# Patient Record
Sex: MALE | Race: WHITE | NOT HISPANIC OR LATINO | Employment: OTHER | URBAN - METROPOLITAN AREA
[De-identification: names, ages, dates, MRNs, and addresses within clinical notes are randomized per-mention and may not be internally consistent; named-entity substitution may affect disease eponyms.]

---

## 2022-01-01 ENCOUNTER — APPOINTMENT (OUTPATIENT)
Dept: RADIOLOGY | Facility: MEDICAL CENTER | Age: 77
DRG: 208 | End: 2022-01-01
Attending: INTERNAL MEDICINE
Payer: MEDICARE

## 2022-01-01 ENCOUNTER — APPOINTMENT (OUTPATIENT)
Dept: RADIOLOGY | Facility: MEDICAL CENTER | Age: 77
DRG: 208 | End: 2022-01-01
Attending: EMERGENCY MEDICINE
Payer: MEDICARE

## 2022-01-01 ENCOUNTER — APPOINTMENT (OUTPATIENT)
Dept: RADIOLOGY | Facility: MEDICAL CENTER | Age: 77
DRG: 208 | End: 2022-01-01
Attending: STUDENT IN AN ORGANIZED HEALTH CARE EDUCATION/TRAINING PROGRAM
Payer: MEDICARE

## 2022-01-01 ENCOUNTER — HOSPITAL ENCOUNTER (INPATIENT)
Facility: MEDICAL CENTER | Age: 77
LOS: 3 days | DRG: 208 | End: 2022-07-24
Attending: EMERGENCY MEDICINE | Admitting: STUDENT IN AN ORGANIZED HEALTH CARE EDUCATION/TRAINING PROGRAM
Payer: MEDICARE

## 2022-01-01 ENCOUNTER — APPOINTMENT (OUTPATIENT)
Dept: CARDIOLOGY | Facility: MEDICAL CENTER | Age: 77
DRG: 208 | End: 2022-01-01
Attending: STUDENT IN AN ORGANIZED HEALTH CARE EDUCATION/TRAINING PROGRAM
Payer: MEDICARE

## 2022-01-01 DIAGNOSIS — J44.9 CHRONIC OBSTRUCTIVE PULMONARY DISEASE, UNSPECIFIED COPD TYPE (HCC): ICD-10-CM

## 2022-01-01 DIAGNOSIS — J90 PLEURAL EFFUSION: ICD-10-CM

## 2022-01-01 DIAGNOSIS — I95.9 HYPOTENSION, UNSPECIFIED HYPOTENSION TYPE: ICD-10-CM

## 2022-01-01 DIAGNOSIS — J98.19 LUNG COLLAPSE: ICD-10-CM

## 2022-01-01 DIAGNOSIS — I46.9 CARDIAC ARREST (HCC): Primary | ICD-10-CM

## 2022-01-01 DIAGNOSIS — R09.02 HYPOXIA: ICD-10-CM

## 2022-01-01 LAB
ALBUMIN SERPL BCP-MCNC: 3.2 G/DL (ref 3.2–4.9)
ALBUMIN/GLOB SERPL: 1.3 G/DL
ALP SERPL-CCNC: 99 U/L (ref 30–99)
ALT SERPL-CCNC: 31 U/L (ref 2–50)
ANION GAP SERPL CALC-SCNC: 10 MMOL/L (ref 7–16)
ANION GAP SERPL CALC-SCNC: 10 MMOL/L (ref 7–16)
ANION GAP SERPL CALC-SCNC: 7 MMOL/L (ref 7–16)
ANION GAP SERPL CALC-SCNC: 9 MMOL/L (ref 7–16)
ANION GAP SERPL CALC-SCNC: 9 MMOL/L (ref 7–16)
APPEARANCE FLD: NORMAL
APPEARANCE UR: CLEAR
AST SERPL-CCNC: 36 U/L (ref 12–45)
BACTERIA #/AREA URNS HPF: NEGATIVE /HPF
BACTERIA BRONCH AEROBE CULT: NORMAL
BACTERIA BRONCH AEROBE CULT: NORMAL
BASE EXCESS BLDA CALC-SCNC: 15 MMOL/L (ref -4–3)
BASE EXCESS BLDA CALC-SCNC: 16 MMOL/L (ref -4–3)
BASE EXCESS BLDA CALC-SCNC: 4 MMOL/L (ref -4–3)
BASE EXCESS BLDA CALC-SCNC: 5 MMOL/L (ref -4–3)
BASE EXCESS BLDA CALC-SCNC: 5 MMOL/L (ref -4–3)
BASE EXCESS BLDV CALC-SCNC: 3 MMOL/L (ref -4–3)
BASOPHILS # BLD AUTO: 0 % (ref 0–1.8)
BASOPHILS # BLD AUTO: 0.1 % (ref 0–1.8)
BASOPHILS # BLD: 0 K/UL (ref 0–0.12)
BASOPHILS # BLD: 0.02 K/UL (ref 0–0.12)
BILIRUB SERPL-MCNC: 0.5 MG/DL (ref 0.1–1.5)
BILIRUB UR QL STRIP.AUTO: NEGATIVE
BODY FLD TYPE: NORMAL
BODY TEMPERATURE: ABNORMAL DEGREES
BREATHS SETTING VENT: 20
BREATHS SETTING VENT: 24
BUN SERPL-MCNC: 15 MG/DL (ref 8–22)
BUN SERPL-MCNC: 15 MG/DL (ref 8–22)
BUN SERPL-MCNC: 17 MG/DL (ref 8–22)
BUN SERPL-MCNC: 17 MG/DL (ref 8–22)
BUN SERPL-MCNC: 20 MG/DL (ref 8–22)
CALCIUM SERPL-MCNC: 8.2 MG/DL (ref 8.5–10.5)
CALCIUM SERPL-MCNC: 8.4 MG/DL (ref 8.5–10.5)
CALCIUM SERPL-MCNC: 8.6 MG/DL (ref 8.5–10.5)
CALCIUM SERPL-MCNC: 8.6 MG/DL (ref 8.5–10.5)
CALCIUM SERPL-MCNC: 8.7 MG/DL (ref 8.5–10.5)
CFT BLD TEG: 5 MIN (ref 4.6–9.1)
CFT P HPASE BLD TEG: 4.5 MIN (ref 4.3–8.3)
CHLORIDE SERPL-SCNC: 85 MMOL/L (ref 96–112)
CHLORIDE SERPL-SCNC: 89 MMOL/L (ref 96–112)
CHLORIDE SERPL-SCNC: 90 MMOL/L (ref 96–112)
CHLORIDE SERPL-SCNC: 92 MMOL/L (ref 96–112)
CHLORIDE SERPL-SCNC: 93 MMOL/L (ref 96–112)
CK SERPL-CCNC: 124 U/L (ref 0–154)
CK SERPL-CCNC: 88 U/L (ref 0–154)
CLOT ANGLE BLD TEG: 74.9 DEGREES (ref 63–78)
CLOT LYSIS 30M P MA LENFR BLD TEG: 0 % (ref 0–2.6)
CO2 BLDA-SCNC: 35 MMOL/L (ref 20–33)
CO2 BLDA-SCNC: 37 MMOL/L (ref 20–33)
CO2 BLDA-SCNC: 38 MMOL/L (ref 20–33)
CO2 BLDA-SCNC: 41 MMOL/L (ref 20–33)
CO2 BLDA-SCNC: 42 MMOL/L (ref 20–33)
CO2 BLDV-SCNC: 39 MMOL/L (ref 20–33)
CO2 SERPL-SCNC: 29 MMOL/L (ref 20–33)
CO2 SERPL-SCNC: 29 MMOL/L (ref 20–33)
CO2 SERPL-SCNC: 31 MMOL/L (ref 20–33)
CO2 SERPL-SCNC: 33 MMOL/L (ref 20–33)
CO2 SERPL-SCNC: 36 MMOL/L (ref 20–33)
COLOR FLD: NORMAL
COLOR UR: YELLOW
CREAT SERPL-MCNC: 0.37 MG/DL (ref 0.5–1.4)
CREAT SERPL-MCNC: 0.44 MG/DL (ref 0.5–1.4)
CREAT SERPL-MCNC: 0.47 MG/DL (ref 0.5–1.4)
CREAT SERPL-MCNC: 0.57 MG/DL (ref 0.5–1.4)
CREAT SERPL-MCNC: 0.7 MG/DL (ref 0.5–1.4)
CRP SERPL HS-MCNC: 8.62 MG/DL (ref 0–0.75)
CT.EXTRINSIC BLD ROTEM: 1.3 MIN (ref 0.8–2.1)
CYTOLOGY REG CYTOL: NORMAL
CYTOLOGY REG CYTOL: NORMAL
D DIMER PPP IA.FEU-MCNC: 3.83 UG/ML (FEU) (ref 0–0.5)
DELSYS IDSYS: ABNORMAL
EKG IMPRESSION: NORMAL
END TIDAL CARBON DIOXIDE IECO2: 37 MMHG
END TIDAL CARBON DIOXIDE IECO2: 39 MMHG
END TIDAL CARBON DIOXIDE IECO2: 40 MMHG
END TIDAL CARBON DIOXIDE IECO2: 44 MMHG
END TIDAL CARBON DIOXIDE IECO2: 47 MMHG
EOSINOPHIL # BLD AUTO: 0 K/UL (ref 0–0.51)
EOSINOPHIL # BLD AUTO: 0.03 K/UL (ref 0–0.51)
EOSINOPHIL NFR BLD: 0 % (ref 0–6.9)
EOSINOPHIL NFR BLD: 0.2 % (ref 0–6.9)
EPI CELLS #/AREA URNS HPF: ABNORMAL /HPF
ERYTHROCYTE [DISTWIDTH] IN BLOOD BY AUTOMATED COUNT: 50 FL (ref 35.9–50)
ERYTHROCYTE [DISTWIDTH] IN BLOOD BY AUTOMATED COUNT: 50.3 FL (ref 35.9–50)
ERYTHROCYTE [DISTWIDTH] IN BLOOD BY AUTOMATED COUNT: 51 FL (ref 35.9–50)
ERYTHROCYTE [DISTWIDTH] IN BLOOD BY AUTOMATED COUNT: 51.7 FL (ref 35.9–50)
FLUAV RNA SPEC QL NAA+PROBE: NEGATIVE
FLUBV RNA SPEC QL NAA+PROBE: NEGATIVE
FUNGUS SPEC FUNGUS STN: NORMAL
FUNGUS SPEC FUNGUS STN: NORMAL
GFR SERPLBLD CREATININE-BSD FMLA CKD-EPI: 101 ML/MIN/1.73 M 2
GFR SERPLBLD CREATININE-BSD FMLA CKD-EPI: 107 ML/MIN/1.73 M 2
GFR SERPLBLD CREATININE-BSD FMLA CKD-EPI: 109 ML/MIN/1.73 M 2
GFR SERPLBLD CREATININE-BSD FMLA CKD-EPI: 115 ML/MIN/1.73 M 2
GFR SERPLBLD CREATININE-BSD FMLA CKD-EPI: 95 ML/MIN/1.73 M 2
GLOBULIN SER CALC-MCNC: 2.4 G/DL (ref 1.9–3.5)
GLUCOSE BLD STRIP.AUTO-MCNC: 193 MG/DL (ref 65–99)
GLUCOSE FLD-MCNC: 146 MG/DL
GLUCOSE SERPL-MCNC: 112 MG/DL (ref 65–99)
GLUCOSE SERPL-MCNC: 126 MG/DL (ref 65–99)
GLUCOSE SERPL-MCNC: 146 MG/DL (ref 65–99)
GLUCOSE SERPL-MCNC: 168 MG/DL (ref 65–99)
GLUCOSE SERPL-MCNC: 224 MG/DL (ref 65–99)
GLUCOSE UR STRIP.AUTO-MCNC: NEGATIVE MG/DL
GRAM STN SPEC: ABNORMAL
GRAM STN SPEC: NORMAL
HCO3 BLDA-SCNC: 32.9 MMOL/L (ref 17–25)
HCO3 BLDA-SCNC: 34.9 MMOL/L (ref 17–25)
HCO3 BLDA-SCNC: 35.1 MMOL/L (ref 17–25)
HCO3 BLDA-SCNC: 39.3 MMOL/L (ref 17–25)
HCO3 BLDA-SCNC: 40.7 MMOL/L (ref 17–25)
HCO3 BLDV-SCNC: 35.9 MMOL/L (ref 24–28)
HCT VFR BLD AUTO: 38.5 % (ref 42–52)
HCT VFR BLD AUTO: 41.3 % (ref 42–52)
HCT VFR BLD AUTO: 44.3 % (ref 42–52)
HCT VFR BLD AUTO: 44.8 % (ref 42–52)
HGB BLD-MCNC: 12.6 G/DL (ref 14–18)
HGB BLD-MCNC: 13.6 G/DL (ref 14–18)
HGB BLD-MCNC: 14.5 G/DL (ref 14–18)
HGB BLD-MCNC: 14.8 G/DL (ref 14–18)
HOROWITZ INDEX BLDA+IHG-RTO: 102 MM[HG]
HOROWITZ INDEX BLDA+IHG-RTO: 50 MM[HG]
HOROWITZ INDEX BLDA+IHG-RTO: 56 MM[HG]
HOROWITZ INDEX BLDA+IHG-RTO: 81 MM[HG]
HOROWITZ INDEX BLDA+IHG-RTO: 97 MM[HG]
HOROWITZ INDEX BLDV+IHG-RTO: 32 MM[HG]
IMM GRANULOCYTES # BLD AUTO: 0.07 K/UL (ref 0–0.11)
IMM GRANULOCYTES # BLD AUTO: 0.07 K/UL (ref 0–0.11)
IMM GRANULOCYTES NFR BLD AUTO: 0.5 % (ref 0–0.9)
IMM GRANULOCYTES NFR BLD AUTO: 0.6 % (ref 0–0.9)
INR PPP: 1.08 (ref 0.87–1.13)
KETONES UR STRIP.AUTO-MCNC: NEGATIVE MG/DL
LACTATE SERPL-SCNC: 2.4 MMOL/L (ref 0.5–2)
LACTATE SERPL-SCNC: 2.5 MMOL/L (ref 0.5–2)
LACTATE SERPL-SCNC: 2.6 MMOL/L (ref 0.5–2)
LDH FLD L TO P-CCNC: 286 U/L
LEUKOCYTE ESTERASE UR QL STRIP.AUTO: NEGATIVE
LV EJECT FRACT  99904: 70
LYMPHOCYTES # BLD AUTO: 0.19 K/UL (ref 1–4.8)
LYMPHOCYTES # BLD AUTO: 0.54 K/UL (ref 1–4.8)
LYMPHOCYTES # BLD AUTO: 0.68 K/UL (ref 1–4.8)
LYMPHOCYTES # BLD AUTO: 1.28 K/UL (ref 1–4.8)
LYMPHOCYTES NFR BLD: 0.9 % (ref 22–41)
LYMPHOCYTES NFR BLD: 4.6 % (ref 22–41)
LYMPHOCYTES NFR BLD: 5 % (ref 22–41)
LYMPHOCYTES NFR BLD: 6.9 % (ref 22–41)
LYMPHOCYTES NFR FLD: 82 %
MAGNESIUM SERPL-MCNC: 2 MG/DL (ref 1.5–2.5)
MAGNESIUM SERPL-MCNC: 2.1 MG/DL (ref 1.5–2.5)
MAGNESIUM SERPL-MCNC: 2.5 MG/DL (ref 1.5–2.5)
MAGNESIUM SERPL-MCNC: 3.3 MG/DL (ref 1.5–2.5)
MANUAL DIFF BLD: NORMAL
MANUAL DIFF BLD: NORMAL
MCF BLD TEG: 63.2 MM (ref 52–69)
MCF.PLATELET INHIB BLD ROTEM: 20.3 MM (ref 15–32)
MCH RBC QN AUTO: 31.1 PG (ref 27–33)
MCH RBC QN AUTO: 31.2 PG (ref 27–33)
MCH RBC QN AUTO: 31.3 PG (ref 27–33)
MCH RBC QN AUTO: 31.7 PG (ref 27–33)
MCHC RBC AUTO-ENTMCNC: 32.7 G/DL (ref 33.7–35.3)
MCHC RBC AUTO-ENTMCNC: 32.7 G/DL (ref 33.7–35.3)
MCHC RBC AUTO-ENTMCNC: 32.9 G/DL (ref 33.7–35.3)
MCHC RBC AUTO-ENTMCNC: 33 G/DL (ref 33.7–35.3)
MCV RBC AUTO: 94.3 FL (ref 81.4–97.8)
MCV RBC AUTO: 95.1 FL (ref 81.4–97.8)
MCV RBC AUTO: 95.2 FL (ref 81.4–97.8)
MCV RBC AUTO: 96.7 FL (ref 81.4–97.8)
MESOTHL CELL NFR FLD: 10 %
MICRO URNS: ABNORMAL
MODE IMODE: ABNORMAL
MONOCYTES # BLD AUTO: 0.65 K/UL (ref 0–0.85)
MONOCYTES # BLD AUTO: 1.21 K/UL (ref 0–0.85)
MONOCYTES # BLD AUTO: 1.39 K/UL (ref 0–0.85)
MONOCYTES # BLD AUTO: 1.84 K/UL (ref 0–0.85)
MONOCYTES NFR BLD AUTO: 10.2 % (ref 0–13.4)
MONOCYTES NFR BLD AUTO: 10.3 % (ref 0–13.4)
MONOCYTES NFR BLD AUTO: 3.5 % (ref 0–13.4)
MONOCYTES NFR BLD AUTO: 8.7 % (ref 0–13.4)
MORPHOLOGY BLD-IMP: NORMAL
MORPHOLOGY BLD-IMP: NORMAL
MYELOCYTES NFR BLD MANUAL: 2.6 %
NEUTROPHILS # BLD AUTO: 11.43 K/UL (ref 1.82–7.42)
NEUTROPHILS # BLD AUTO: 16.1 K/UL (ref 1.82–7.42)
NEUTROPHILS # BLD AUTO: 19.16 K/UL (ref 1.82–7.42)
NEUTROPHILS # BLD AUTO: 9.97 K/UL (ref 1.82–7.42)
NEUTROPHILS NFR BLD: 84 % (ref 44–72)
NEUTROPHILS NFR BLD: 84.5 % (ref 44–72)
NEUTROPHILS NFR BLD: 87 % (ref 44–72)
NEUTROPHILS NFR BLD: 90.4 % (ref 44–72)
NEUTROPHILS NFR FLD: 8 %
NITRITE UR QL STRIP.AUTO: NEGATIVE
NRBC # BLD AUTO: 0 K/UL
NRBC BLD-RTO: 0 /100 WBC
NT-PROBNP SERPL IA-MCNC: 918 PG/ML (ref 0–125)
O2/TOTAL GAS SETTING VFR VENT: 100 %
O2/TOTAL GAS SETTING VFR VENT: 60 %
O2/TOTAL GAS SETTING VFR VENT: 60 %
O2/TOTAL GAS SETTING VFR VENT: 90 %
OSMOLALITY UR: 493 MOSM/KG H2O (ref 300–900)
PA AA BLD-ACNC: 10 % (ref 0–11)
PA ADP BLD-ACNC: 9.3 % (ref 0–17)
PCO2 BLDA: 46.3 MMHG (ref 26–37)
PCO2 BLDA: 50 MMHG (ref 26–37)
PCO2 BLDA: 61.8 MMHG (ref 26–37)
PCO2 BLDA: 81.1 MMHG (ref 26–37)
PCO2 BLDA: 86.6 MMHG (ref 26–37)
PCO2 BLDV: 94.4 MMHG (ref 41–51)
PCO2 TEMP ADJ BLDA: 47.7 MMHG (ref 26–37)
PCO2 TEMP ADJ BLDA: 50.9 MMHG (ref 26–37)
PCO2 TEMP ADJ BLDA: 64 MMHG (ref 26–37)
PCO2 TEMP ADJ BLDA: 77 MMHG (ref 26–37)
PCO2 TEMP ADJ BLDA: 82.9 MMHG (ref 26–37)
PCO2 TEMP ADJ BLDV: 90.3 MMHG (ref 41–51)
PEEP END EXPIRATORY PRESSURE IPEEP: 10 CMH20
PEEP END EXPIRATORY PRESSURE IPEEP: 10 CMH20
PEEP END EXPIRATORY PRESSURE IPEEP: 12 CMH20
PEEP END EXPIRATORY PRESSURE IPEEP: 14 CMH20
PERCENT MINUTE VOLUME IPMV: 100
PERCENT MINUTE VOLUME IPMV: 80
PH BLDA: 7.21 [PH] (ref 7.4–7.5)
PH BLDA: 7.24 [PH] (ref 7.4–7.5)
PH BLDA: 7.33 [PH] (ref 7.4–7.5)
PH BLDA: 7.52 [PH] (ref 7.4–7.5)
PH BLDA: 7.54 [PH] (ref 7.4–7.5)
PH BLDV: 7.19 [PH] (ref 7.31–7.45)
PH FLD: 8 [PH]
PH TEMP ADJ BLDA: 7.23 [PH] (ref 7.4–7.5)
PH TEMP ADJ BLDA: 7.26 [PH] (ref 7.4–7.5)
PH TEMP ADJ BLDA: 7.32 [PH] (ref 7.4–7.5)
PH TEMP ADJ BLDA: 7.51 [PH] (ref 7.4–7.5)
PH TEMP ADJ BLDA: 7.53 [PH] (ref 7.4–7.5)
PH TEMP ADJ BLDV: 7.2 [PH] (ref 7.31–7.45)
PH UR STRIP.AUTO: 5 [PH] (ref 5–8)
PHOSPHATE SERPL-MCNC: 2.2 MG/DL (ref 2.5–4.5)
PHOSPHATE SERPL-MCNC: 2.9 MG/DL (ref 2.5–4.5)
PHOSPHATE SERPL-MCNC: 4 MG/DL (ref 2.5–4.5)
PHOSPHATE SERPL-MCNC: 4.9 MG/DL (ref 2.5–4.5)
PLATELET # BLD AUTO: 175 K/UL (ref 164–446)
PLATELET # BLD AUTO: 177 K/UL (ref 164–446)
PLATELET # BLD AUTO: 248 K/UL (ref 164–446)
PLATELET # BLD AUTO: 268 K/UL (ref 164–446)
PLATELET BLD QL SMEAR: NORMAL
PLATELET BLD QL SMEAR: NORMAL
PMV BLD AUTO: 10.1 FL (ref 9–12.9)
PMV BLD AUTO: 10.5 FL (ref 9–12.9)
PMV BLD AUTO: 10.5 FL (ref 9–12.9)
PMV BLD AUTO: 10.6 FL (ref 9–12.9)
PO2 BLDA: 50 MMHG (ref 64–87)
PO2 BLDA: 56 MMHG (ref 64–87)
PO2 BLDA: 58 MMHG (ref 64–87)
PO2 BLDA: 61 MMHG (ref 64–87)
PO2 BLDA: 73 MMHG (ref 64–87)
PO2 BLDV: 32 MMHG (ref 25–40)
PO2 TEMP ADJ BLDA: 46 MMHG (ref 64–87)
PO2 TEMP ADJ BLDA: 52 MMHG (ref 64–87)
PO2 TEMP ADJ BLDA: 59 MMHG (ref 64–87)
PO2 TEMP ADJ BLDA: 64 MMHG (ref 64–87)
PO2 TEMP ADJ BLDA: 77 MMHG (ref 64–87)
PO2 TEMP ADJ BLDV: 30 MMHG (ref 25–40)
POTASSIUM SERPL-SCNC: 3.7 MMOL/L (ref 3.6–5.5)
POTASSIUM SERPL-SCNC: 4 MMOL/L (ref 3.6–5.5)
POTASSIUM SERPL-SCNC: 4.2 MMOL/L (ref 3.6–5.5)
POTASSIUM SERPL-SCNC: 4.2 MMOL/L (ref 3.6–5.5)
POTASSIUM SERPL-SCNC: 4.8 MMOL/L (ref 3.6–5.5)
PREALB SERPL-MCNC: 12 MG/DL (ref 18–38)
PROCALCITONIN SERPL-MCNC: 0.14 NG/ML
PROLACTIN SERPL-MCNC: 55.6 NG/ML (ref 2.1–17.7)
PROT FLD-MCNC: 3.7 G/DL
PROT SERPL-MCNC: 5.6 G/DL (ref 6–8.2)
PROT UR QL STRIP: 100 MG/DL
PROTHROMBIN TIME: 13.9 SEC (ref 12–14.6)
RBC # BLD AUTO: 4.05 M/UL (ref 4.7–6.1)
RBC # BLD AUTO: 4.34 M/UL (ref 4.7–6.1)
RBC # BLD AUTO: 4.58 M/UL (ref 4.7–6.1)
RBC # BLD AUTO: 4.75 M/UL (ref 4.7–6.1)
RBC # FLD: NORMAL CELLS/UL
RBC # URNS HPF: ABNORMAL /HPF
RBC BLD AUTO: NORMAL
RBC BLD AUTO: NORMAL
RBC UR QL AUTO: ABNORMAL
RENAL EPI CELLS #/AREA URNS HPF: NEGATIVE /HPF
RSV RNA SPEC QL NAA+PROBE: NEGATIVE
SAO2 % BLDA: 76 % (ref 93–99)
SAO2 % BLDA: 80 % (ref 93–99)
SAO2 % BLDA: 92 % (ref 93–99)
SAO2 % BLDA: 93 % (ref 93–99)
SAO2 % BLDA: 93 % (ref 93–99)
SAO2 % BLDV: 45 %
SARS-COV-2 RNA RESP QL NAA+PROBE: NOTDETECTED
SIGNIFICANT IND 70042: ABNORMAL
SIGNIFICANT IND 70042: NORMAL
SITE SITE: ABNORMAL
SITE SITE: NORMAL
SODIUM SERPL-SCNC: 128 MMOL/L (ref 135–145)
SODIUM SERPL-SCNC: 128 MMOL/L (ref 135–145)
SODIUM SERPL-SCNC: 131 MMOL/L (ref 135–145)
SODIUM SERPL-SCNC: 132 MMOL/L (ref 135–145)
SODIUM SERPL-SCNC: 133 MMOL/L (ref 135–145)
SODIUM UR-SCNC: <20 MMOL/L
SOURCE SOURCE: ABNORMAL
SOURCE SOURCE: NORMAL
SP GR UR STRIP.AUTO: >=1.045
SPECIMEN DRAWN FROM PATIENT: ABNORMAL
SPECIMEN SOURCE: NORMAL
TEG ALGORITHM TGALG: NORMAL
TIDAL VOLUME IVT: 380 ML
TIDAL VOLUME IVT: 400 ML
TROPONIN T SERPL-MCNC: 76 NG/L (ref 6–19)
UROBILINOGEN UR STRIP.AUTO-MCNC: 0.2 MG/DL
WBC # BLD AUTO: 11.8 K/UL (ref 4.8–10.8)
WBC # BLD AUTO: 13.6 K/UL (ref 4.8–10.8)
WBC # BLD AUTO: 18.5 K/UL (ref 4.8–10.8)
WBC # BLD AUTO: 21.2 K/UL (ref 4.8–10.8)
WBC # FLD: 1877 CELLS/UL
WBC #/AREA URNS HPF: ABNORMAL /HPF

## 2022-01-01 PROCEDURE — 94150 VITAL CAPACITY TEST: CPT

## 2022-01-01 PROCEDURE — 96375 TX/PRO/DX INJ NEW DRUG ADDON: CPT

## 2022-01-01 PROCEDURE — 85025 COMPLETE CBC W/AUTO DIFF WBC: CPT

## 2022-01-01 PROCEDURE — 94002 VENT MGMT INPAT INIT DAY: CPT

## 2022-01-01 PROCEDURE — 82550 ASSAY OF CK (CPK): CPT

## 2022-01-01 PROCEDURE — 74175 CTA ABDOMEN W/CONTRAST: CPT

## 2022-01-01 PROCEDURE — 36600 WITHDRAWAL OF ARTERIAL BLOOD: CPT

## 2022-01-01 PROCEDURE — 700105 HCHG RX REV CODE 258: Performed by: INTERNAL MEDICINE

## 2022-01-01 PROCEDURE — 96366 THER/PROPH/DIAG IV INF ADDON: CPT

## 2022-01-01 PROCEDURE — 83605 ASSAY OF LACTIC ACID: CPT

## 2022-01-01 PROCEDURE — 80053 COMPREHEN METABOLIC PANEL: CPT

## 2022-01-01 PROCEDURE — 99292 CRITICAL CARE ADDL 30 MIN: CPT | Mod: GC | Performed by: STUDENT IN AN ORGANIZED HEALTH CARE EDUCATION/TRAINING PROGRAM

## 2022-01-01 PROCEDURE — 51702 INSERT TEMP BLADDER CATH: CPT

## 2022-01-01 PROCEDURE — 87205 SMEAR GRAM STAIN: CPT | Mod: 91

## 2022-01-01 PROCEDURE — 700111 HCHG RX REV CODE 636 W/ 250 OVERRIDE (IP): Performed by: INTERNAL MEDICINE

## 2022-01-01 PROCEDURE — 700111 HCHG RX REV CODE 636 W/ 250 OVERRIDE (IP): Performed by: STUDENT IN AN ORGANIZED HEALTH CARE EDUCATION/TRAINING PROGRAM

## 2022-01-01 PROCEDURE — 85347 COAGULATION TIME ACTIVATED: CPT

## 2022-01-01 PROCEDURE — 700102 HCHG RX REV CODE 250 W/ 637 OVERRIDE(OP): Performed by: INTERNAL MEDICINE

## 2022-01-01 PROCEDURE — 88341 IMHCHEM/IMCYTCHM EA ADD ANTB: CPT | Mod: 91

## 2022-01-01 PROCEDURE — 84484 ASSAY OF TROPONIN QUANT: CPT

## 2022-01-01 PROCEDURE — A9270 NON-COVERED ITEM OR SERVICE: HCPCS | Performed by: INTERNAL MEDICINE

## 2022-01-01 PROCEDURE — 80048 BASIC METABOLIC PNL TOTAL CA: CPT

## 2022-01-01 PROCEDURE — 32554 ASPIRATE PLEURA W/O IMAGING: CPT

## 2022-01-01 PROCEDURE — 700102 HCHG RX REV CODE 250 W/ 637 OVERRIDE(OP): Performed by: STUDENT IN AN ORGANIZED HEALTH CARE EDUCATION/TRAINING PROGRAM

## 2022-01-01 PROCEDURE — 31624 DX BRONCHOSCOPE/LAVAGE: CPT | Performed by: INTERNAL MEDICINE

## 2022-01-01 PROCEDURE — 700101 HCHG RX REV CODE 250: Performed by: STUDENT IN AN ORGANIZED HEALTH CARE EDUCATION/TRAINING PROGRAM

## 2022-01-01 PROCEDURE — 93970 EXTREMITY STUDY: CPT

## 2022-01-01 PROCEDURE — 92950 HEART/LUNG RESUSCITATION CPR: CPT

## 2022-01-01 PROCEDURE — 84100 ASSAY OF PHOSPHORUS: CPT

## 2022-01-01 PROCEDURE — 85576 BLOOD PLATELET AGGREGATION: CPT | Mod: 91

## 2022-01-01 PROCEDURE — 0W9B3ZX DRAINAGE OF LEFT PLEURAL CAVITY, PERCUTANEOUS APPROACH, DIAGNOSTIC: ICD-10-PCS | Performed by: STUDENT IN AN ORGANIZED HEALTH CARE EDUCATION/TRAINING PROGRAM

## 2022-01-01 PROCEDURE — 700117 HCHG RX CONTRAST REV CODE 255: Performed by: EMERGENCY MEDICINE

## 2022-01-01 PROCEDURE — 36415 COLL VENOUS BLD VENIPUNCTURE: CPT

## 2022-01-01 PROCEDURE — 93306 TTE W/DOPPLER COMPLETE: CPT | Mod: 26 | Performed by: INTERNAL MEDICINE

## 2022-01-01 PROCEDURE — 83735 ASSAY OF MAGNESIUM: CPT

## 2022-01-01 PROCEDURE — 93005 ELECTROCARDIOGRAM TRACING: CPT | Performed by: STUDENT IN AN ORGANIZED HEALTH CARE EDUCATION/TRAINING PROGRAM

## 2022-01-01 PROCEDURE — 94640 AIRWAY INHALATION TREATMENT: CPT

## 2022-01-01 PROCEDURE — 93005 ELECTROCARDIOGRAM TRACING: CPT | Performed by: EMERGENCY MEDICINE

## 2022-01-01 PROCEDURE — 71045 X-RAY EXAM CHEST 1 VIEW: CPT

## 2022-01-01 PROCEDURE — 87070 CULTURE OTHR SPECIMN AEROBIC: CPT | Mod: 91

## 2022-01-01 PROCEDURE — 5A1945Z RESPIRATORY VENTILATION, 24-96 CONSECUTIVE HOURS: ICD-10-PCS | Performed by: EMERGENCY MEDICINE

## 2022-01-01 PROCEDURE — 700105 HCHG RX REV CODE 258: Performed by: EMERGENCY MEDICINE

## 2022-01-01 PROCEDURE — 700111 HCHG RX REV CODE 636 W/ 250 OVERRIDE (IP)

## 2022-01-01 PROCEDURE — 83615 LACTATE (LD) (LDH) ENZYME: CPT

## 2022-01-01 PROCEDURE — 36620 INSERTION CATHETER ARTERY: CPT

## 2022-01-01 PROCEDURE — A9270 NON-COVERED ITEM OR SERVICE: HCPCS | Performed by: STUDENT IN AN ORGANIZED HEALTH CARE EDUCATION/TRAINING PROGRAM

## 2022-01-01 PROCEDURE — 81001 URINALYSIS AUTO W/SCOPE: CPT

## 2022-01-01 PROCEDURE — 770022 HCHG ROOM/CARE - ICU (200)

## 2022-01-01 PROCEDURE — 84146 ASSAY OF PROLACTIN: CPT

## 2022-01-01 PROCEDURE — 36620 INSERTION CATHETER ARTERY: CPT | Performed by: NURSE PRACTITIONER

## 2022-01-01 PROCEDURE — 99291 CRITICAL CARE FIRST HOUR: CPT

## 2022-01-01 PROCEDURE — 85384 FIBRINOGEN ACTIVITY: CPT

## 2022-01-01 PROCEDURE — 700105 HCHG RX REV CODE 258: Performed by: STUDENT IN AN ORGANIZED HEALTH CARE EDUCATION/TRAINING PROGRAM

## 2022-01-01 PROCEDURE — 94669 MECHANICAL CHEST WALL OSCILL: CPT

## 2022-01-01 PROCEDURE — 99291 CRITICAL CARE FIRST HOUR: CPT | Mod: GC | Performed by: STUDENT IN AN ORGANIZED HEALTH CARE EDUCATION/TRAINING PROGRAM

## 2022-01-01 PROCEDURE — 96367 TX/PROPH/DG ADDL SEQ IV INF: CPT

## 2022-01-01 PROCEDURE — 99291 CRITICAL CARE FIRST HOUR: CPT | Performed by: INTERNAL MEDICINE

## 2022-01-01 PROCEDURE — 85007 BL SMEAR W/DIFF WBC COUNT: CPT

## 2022-01-01 PROCEDURE — 304538 HCHG NG TUBE

## 2022-01-01 PROCEDURE — 05HY33Z INSERTION OF INFUSION DEVICE INTO UPPER VEIN, PERCUTANEOUS APPROACH: ICD-10-PCS | Performed by: EMERGENCY MEDICINE

## 2022-01-01 PROCEDURE — 32555 ASPIRATE PLEURA W/ IMAGING: CPT | Mod: LT | Performed by: STUDENT IN AN ORGANIZED HEALTH CARE EDUCATION/TRAINING PROGRAM

## 2022-01-01 PROCEDURE — 83880 ASSAY OF NATRIURETIC PEPTIDE: CPT

## 2022-01-01 PROCEDURE — 96365 THER/PROPH/DIAG IV INF INIT: CPT

## 2022-01-01 PROCEDURE — 99292 CRITICAL CARE ADDL 30 MIN: CPT | Performed by: INTERNAL MEDICINE

## 2022-01-01 PROCEDURE — 94003 VENT MGMT INPAT SUBQ DAY: CPT

## 2022-01-01 PROCEDURE — 84300 ASSAY OF URINE SODIUM: CPT

## 2022-01-01 PROCEDURE — 99292 CRITICAL CARE ADDL 30 MIN: CPT | Mod: 25 | Performed by: INTERNAL MEDICINE

## 2022-01-01 PROCEDURE — 0241U HCHG SARS-COV-2 COVID-19 NFCT DS RESP RNA 4 TRGT MIC: CPT

## 2022-01-01 PROCEDURE — 700101 HCHG RX REV CODE 250

## 2022-01-01 PROCEDURE — 82962 GLUCOSE BLOOD TEST: CPT

## 2022-01-01 PROCEDURE — 82803 BLOOD GASES ANY COMBINATION: CPT | Mod: 91

## 2022-01-01 PROCEDURE — 85610 PROTHROMBIN TIME: CPT

## 2022-01-01 PROCEDURE — 700101 HCHG RX REV CODE 250: Performed by: INTERNAL MEDICINE

## 2022-01-01 PROCEDURE — 99152 MOD SED SAME PHYS/QHP 5/>YRS: CPT

## 2022-01-01 PROCEDURE — C1751 CATH, INF, PER/CENT/MIDLINE: HCPCS

## 2022-01-01 PROCEDURE — 88112 CYTOPATH CELL ENHANCE TECH: CPT

## 2022-01-01 PROCEDURE — 700111 HCHG RX REV CODE 636 W/ 250 OVERRIDE (IP): Performed by: EMERGENCY MEDICINE

## 2022-01-01 PROCEDURE — 4A10X4Z MONITORING OF CENTRAL NERVOUS ELECTRICAL ACTIVITY, EXTERNAL APPROACH: ICD-10-PCS | Performed by: PSYCHIATRY & NEUROLOGY

## 2022-01-01 PROCEDURE — 89051 BODY FLUID CELL COUNT: CPT

## 2022-01-01 PROCEDURE — 87015 SPECIMEN INFECT AGNT CONCNTJ: CPT

## 2022-01-01 PROCEDURE — 85379 FIBRIN DEGRADATION QUANT: CPT

## 2022-01-01 PROCEDURE — 94799 UNLISTED PULMONARY SVC/PX: CPT

## 2022-01-01 PROCEDURE — 84157 ASSAY OF PROTEIN OTHER: CPT

## 2022-01-01 PROCEDURE — 88342 IMHCHEM/IMCYTCHM 1ST ANTB: CPT

## 2022-01-01 PROCEDURE — 87040 BLOOD CULTURE FOR BACTERIA: CPT | Mod: 91

## 2022-01-01 PROCEDURE — 36556 INSERT NON-TUNNEL CV CATH: CPT

## 2022-01-01 PROCEDURE — 83986 ASSAY PH BODY FLUID NOS: CPT

## 2022-01-01 PROCEDURE — 93010 ELECTROCARDIOGRAM REPORT: CPT | Performed by: INTERNAL MEDICINE

## 2022-01-01 PROCEDURE — 87075 CULTR BACTERIA EXCEPT BLOOD: CPT

## 2022-01-01 PROCEDURE — 303105 HCHG CATHETER EXTRA

## 2022-01-01 PROCEDURE — C9803 HOPD COVID-19 SPEC COLLECT: HCPCS | Performed by: EMERGENCY MEDICINE

## 2022-01-01 PROCEDURE — 88305 TISSUE EXAM BY PATHOLOGIST: CPT

## 2022-01-01 PROCEDURE — 302977 HCHG BRONCHOSCOPY PROC-THERAPEUTIC

## 2022-01-01 PROCEDURE — 83935 ASSAY OF URINE OSMOLALITY: CPT

## 2022-01-01 PROCEDURE — 96368 THER/DIAG CONCURRENT INF: CPT

## 2022-01-01 PROCEDURE — 84145 PROCALCITONIN (PCT): CPT

## 2022-01-01 PROCEDURE — 82945 GLUCOSE OTHER FLUID: CPT

## 2022-01-01 PROCEDURE — 87102 FUNGUS ISOLATION CULTURE: CPT

## 2022-01-01 PROCEDURE — 0B9C8ZX DRAINAGE OF RIGHT UPPER LUNG LOBE, VIA NATURAL OR ARTIFICIAL OPENING ENDOSCOPIC, DIAGNOSTIC: ICD-10-PCS | Performed by: INTERNAL MEDICINE

## 2022-01-01 PROCEDURE — 99291 CRITICAL CARE FIRST HOUR: CPT | Mod: 25 | Performed by: INTERNAL MEDICINE

## 2022-01-01 PROCEDURE — 37799 UNLISTED PX VASCULAR SURGERY: CPT

## 2022-01-01 PROCEDURE — 84134 ASSAY OF PREALBUMIN: CPT

## 2022-01-01 PROCEDURE — 700101 HCHG RX REV CODE 250: Performed by: EMERGENCY MEDICINE

## 2022-01-01 PROCEDURE — 31646 BRNCHSC W/THER ASPIR SBSQ: CPT | Performed by: INTERNAL MEDICINE

## 2022-01-01 PROCEDURE — 94760 N-INVAS EAR/PLS OXIMETRY 1: CPT

## 2022-01-01 PROCEDURE — 5A12012 PERFORMANCE OF CARDIAC OUTPUT, SINGLE, MANUAL: ICD-10-PCS | Performed by: EMERGENCY MEDICINE

## 2022-01-01 PROCEDURE — 03HY33Z INSERTION OF INFUSION DEVICE INTO UPPER ARTERY, PERCUTANEOUS APPROACH: ICD-10-PCS | Performed by: STUDENT IN AN ORGANIZED HEALTH CARE EDUCATION/TRAINING PROGRAM

## 2022-01-01 PROCEDURE — 95822 EEG COMA OR SLEEP ONLY: CPT | Performed by: PSYCHIATRY & NEUROLOGY

## 2022-01-01 PROCEDURE — 93005 ELECTROCARDIOGRAM TRACING: CPT | Performed by: INTERNAL MEDICINE

## 2022-01-01 PROCEDURE — 70450 CT HEAD/BRAIN W/O DYE: CPT

## 2022-01-01 PROCEDURE — 95822 EEG COMA OR SLEEP ONLY: CPT | Mod: 26 | Performed by: PSYCHIATRY & NEUROLOGY

## 2022-01-01 PROCEDURE — 99291 CRITICAL CARE FIRST HOUR: CPT | Performed by: STUDENT IN AN ORGANIZED HEALTH CARE EDUCATION/TRAINING PROGRAM

## 2022-01-01 PROCEDURE — 93306 TTE W/DOPPLER COMPLETE: CPT

## 2022-01-01 PROCEDURE — 0B9G8ZX DRAINAGE OF LEFT UPPER LUNG LOBE, VIA NATURAL OR ARTIFICIAL OPENING ENDOSCOPIC, DIAGNOSTIC: ICD-10-PCS | Performed by: INTERNAL MEDICINE

## 2022-01-01 PROCEDURE — 86140 C-REACTIVE PROTEIN: CPT

## 2022-01-01 RX ORDER — IPRATROPIUM BROMIDE AND ALBUTEROL SULFATE 2.5; .5 MG/3ML; MG/3ML
3 SOLUTION RESPIRATORY (INHALATION)
Status: DISCONTINUED | OUTPATIENT
Start: 2022-01-01 | End: 2022-01-01

## 2022-01-01 RX ORDER — ONDANSETRON 4 MG/1
8 TABLET, ORALLY DISINTEGRATING ORAL EVERY 8 HOURS PRN
Status: DISCONTINUED | OUTPATIENT
Start: 2022-01-01 | End: 2022-07-25 | Stop reason: HOSPADM

## 2022-01-01 RX ORDER — SODIUM CHLORIDE, SODIUM LACTATE, POTASSIUM CHLORIDE, AND CALCIUM CHLORIDE .6; .31; .03; .02 G/100ML; G/100ML; G/100ML; G/100ML
500 INJECTION, SOLUTION INTRAVENOUS ONCE
Status: COMPLETED | OUTPATIENT
Start: 2022-01-01 | End: 2022-01-01

## 2022-01-01 RX ORDER — ONDANSETRON 2 MG/ML
8 INJECTION INTRAMUSCULAR; INTRAVENOUS EVERY 8 HOURS PRN
Status: DISCONTINUED | OUTPATIENT
Start: 2022-01-01 | End: 2022-07-25 | Stop reason: HOSPADM

## 2022-01-01 RX ORDER — ENOXAPARIN SODIUM 100 MG/ML
40 INJECTION SUBCUTANEOUS DAILY
Status: DISCONTINUED | OUTPATIENT
Start: 2022-01-01 | End: 2022-01-01

## 2022-01-01 RX ORDER — ATROPINE SULFATE 10 MG/ML
2 SOLUTION/ DROPS OPHTHALMIC EVERY 4 HOURS PRN
Status: DISCONTINUED | OUTPATIENT
Start: 2022-01-01 | End: 2022-07-25 | Stop reason: HOSPADM

## 2022-01-01 RX ORDER — AZITHROMYCIN 250 MG/1
500 TABLET, FILM COATED ORAL DAILY
Status: COMPLETED | OUTPATIENT
Start: 2022-01-01 | End: 2022-01-01

## 2022-01-01 RX ORDER — LORAZEPAM 2 MG/ML
1 INJECTION INTRAMUSCULAR
Status: DISCONTINUED | OUTPATIENT
Start: 2022-01-01 | End: 2022-07-25 | Stop reason: HOSPADM

## 2022-01-01 RX ORDER — IPRATROPIUM BROMIDE AND ALBUTEROL SULFATE 2.5; .5 MG/3ML; MG/3ML
SOLUTION RESPIRATORY (INHALATION)
Status: COMPLETED
Start: 2022-01-01 | End: 2022-01-01

## 2022-01-01 RX ORDER — FUROSEMIDE 10 MG/ML
20 INJECTION INTRAMUSCULAR; INTRAVENOUS
Status: DISCONTINUED | OUTPATIENT
Start: 2022-01-01 | End: 2022-01-01

## 2022-01-01 RX ORDER — MEPERIDINE HYDROCHLORIDE 50 MG/ML
25 INJECTION INTRAMUSCULAR; INTRAVENOUS; SUBCUTANEOUS
Status: DISCONTINUED | OUTPATIENT
Start: 2022-01-01 | End: 2022-01-01

## 2022-01-01 RX ORDER — POLYETHYLENE GLYCOL 3350 17 G/17G
1 POWDER, FOR SOLUTION ORAL
Status: DISCONTINUED | OUTPATIENT
Start: 2022-01-01 | End: 2022-01-01

## 2022-01-01 RX ORDER — METHYLPREDNISOLONE SODIUM SUCCINATE 40 MG/ML
40 INJECTION, POWDER, LYOPHILIZED, FOR SOLUTION INTRAMUSCULAR; INTRAVENOUS DAILY
Status: DISCONTINUED | OUTPATIENT
Start: 2022-01-01 | End: 2022-01-01

## 2022-01-01 RX ORDER — PHENYLEPHRINE HCL IN 0.9% NACL 0.5 MG/5ML
100 SYRINGE (ML) INTRAVENOUS
Status: DISPENSED | OUTPATIENT
Start: 2022-01-01 | End: 2022-01-01

## 2022-01-01 RX ORDER — MIDAZOLAM HYDROCHLORIDE 1 MG/ML
INJECTION INTRAMUSCULAR; INTRAVENOUS
Status: COMPLETED
Start: 2022-01-01 | End: 2022-01-01

## 2022-01-01 RX ORDER — MORPHINE SULFATE 100 MG/5ML
20 SOLUTION ORAL
Status: DISCONTINUED | OUTPATIENT
Start: 2022-01-01 | End: 2022-07-25 | Stop reason: HOSPADM

## 2022-01-01 RX ORDER — FUROSEMIDE 10 MG/ML
40 INJECTION INTRAMUSCULAR; INTRAVENOUS ONCE
Status: COMPLETED | OUTPATIENT
Start: 2022-01-01 | End: 2022-01-01

## 2022-01-01 RX ORDER — ALBUTEROL SULFATE 90 UG/1
2 AEROSOL, METERED RESPIRATORY (INHALATION) EVERY 4 HOURS PRN
COMMUNITY

## 2022-01-01 RX ORDER — BUSPIRONE HYDROCHLORIDE 10 MG/1
15 TABLET ORAL 2 TIMES DAILY
Status: DISCONTINUED | OUTPATIENT
Start: 2022-01-01 | End: 2022-01-01

## 2022-01-01 RX ORDER — MORPHINE SULFATE 100 MG/5ML
10 SOLUTION ORAL
Status: DISCONTINUED | OUTPATIENT
Start: 2022-01-01 | End: 2022-07-25 | Stop reason: HOSPADM

## 2022-01-01 RX ORDER — FAMOTIDINE 20 MG/1
20 TABLET, FILM COATED ORAL EVERY 12 HOURS
Status: DISCONTINUED | OUTPATIENT
Start: 2022-01-01 | End: 2022-01-01

## 2022-01-01 RX ORDER — BISACODYL 10 MG
10 SUPPOSITORY, RECTAL RECTAL
Status: DISCONTINUED | OUTPATIENT
Start: 2022-01-01 | End: 2022-01-01

## 2022-01-01 RX ORDER — CEFTRIAXONE 1 G/1
1000 INJECTION, POWDER, FOR SOLUTION INTRAMUSCULAR; INTRAVENOUS DAILY
Status: DISCONTINUED | OUTPATIENT
Start: 2022-01-01 | End: 2022-01-01

## 2022-01-01 RX ORDER — NOREPINEPHRINE BITARTRATE 0.03 MG/ML
0-40 INJECTION, SOLUTION INTRAVENOUS CONTINUOUS
Status: DISCONTINUED | OUTPATIENT
Start: 2022-01-01 | End: 2022-01-01

## 2022-01-01 RX ORDER — ACETAZOLAMIDE 500 MG/5ML
250 INJECTION, POWDER, LYOPHILIZED, FOR SOLUTION INTRAVENOUS ONCE
Status: DISCONTINUED | OUTPATIENT
Start: 2022-01-01 | End: 2022-01-01

## 2022-01-01 RX ORDER — TIOTROPIUM BROMIDE AND OLODATEROL 3.124; 2.736 UG/1; UG/1
SPRAY, METERED RESPIRATORY (INHALATION) DAILY
COMMUNITY

## 2022-01-01 RX ORDER — VECURONIUM BROMIDE 1 MG/ML
0.1 INJECTION, POWDER, LYOPHILIZED, FOR SOLUTION INTRAVENOUS ONCE
Status: DISCONTINUED | OUTPATIENT
Start: 2022-01-01 | End: 2022-01-01

## 2022-01-01 RX ORDER — ACETAMINOPHEN 325 MG/1
650 TABLET ORAL EVERY 6 HOURS PRN
Status: DISCONTINUED | OUTPATIENT
Start: 2022-01-01 | End: 2022-01-01

## 2022-01-01 RX ORDER — FUROSEMIDE 10 MG/ML
40 INJECTION INTRAMUSCULAR; INTRAVENOUS
Status: DISCONTINUED | OUTPATIENT
Start: 2022-01-01 | End: 2022-01-01

## 2022-01-01 RX ORDER — MAGNESIUM SULFATE HEPTAHYDRATE 40 MG/ML
.5-2 INJECTION, SOLUTION INTRAVENOUS CONTINUOUS
Status: DISCONTINUED | OUTPATIENT
Start: 2022-01-01 | End: 2022-01-01

## 2022-01-01 RX ORDER — MORPHINE SULFATE 4 MG/ML
1-2 INJECTION INTRAVENOUS
Status: DISCONTINUED | OUTPATIENT
Start: 2022-01-01 | End: 2022-07-25 | Stop reason: HOSPADM

## 2022-01-01 RX ORDER — FUROSEMIDE 40 MG/1
40 TABLET ORAL DAILY
COMMUNITY

## 2022-01-01 RX ORDER — AZITHROMYCIN 500 MG/5ML
500 INJECTION, POWDER, LYOPHILIZED, FOR SOLUTION INTRAVENOUS EVERY 24 HOURS
Status: DISCONTINUED | OUTPATIENT
Start: 2022-01-01 | End: 2022-01-01

## 2022-01-01 RX ORDER — MIDAZOLAM HYDROCHLORIDE 1 MG/ML
2 INJECTION INTRAMUSCULAR; INTRAVENOUS ONCE
Status: COMPLETED | OUTPATIENT
Start: 2022-01-01 | End: 2022-01-01

## 2022-01-01 RX ORDER — ACETAMINOPHEN 500 MG
1000 TABLET ORAL EVERY 6 HOURS
Status: DISCONTINUED | OUTPATIENT
Start: 2022-01-01 | End: 2022-01-01

## 2022-01-01 RX ORDER — POLYVINYL ALCOHOL 14 MG/ML
2 SOLUTION/ DROPS OPHTHALMIC EVERY 6 HOURS PRN
Status: DISCONTINUED | OUTPATIENT
Start: 2022-01-01 | End: 2022-07-25 | Stop reason: HOSPADM

## 2022-01-01 RX ORDER — DEXMEDETOMIDINE HYDROCHLORIDE 4 UG/ML
.1-1.5 INJECTION INTRAVENOUS CONTINUOUS
Status: DISCONTINUED | OUTPATIENT
Start: 2022-01-01 | End: 2022-01-01

## 2022-01-01 RX ORDER — EPINEPHRINE 0.1 MG/ML
SYRINGE (ML) INJECTION
Status: COMPLETED | OUTPATIENT
Start: 2022-01-01 | End: 2022-01-01

## 2022-01-01 RX ORDER — ACETAZOLAMIDE 500 MG/5ML
250 INJECTION, POWDER, LYOPHILIZED, FOR SOLUTION INTRAVENOUS EVERY 12 HOURS
Status: DISCONTINUED | OUTPATIENT
Start: 2022-01-01 | End: 2022-01-01

## 2022-01-01 RX ORDER — AMOXICILLIN 250 MG
2 CAPSULE ORAL 2 TIMES DAILY
Status: DISCONTINUED | OUTPATIENT
Start: 2022-01-01 | End: 2022-01-01

## 2022-01-01 RX ORDER — LORAZEPAM 2 MG/ML
1 CONCENTRATE ORAL
Status: DISCONTINUED | OUTPATIENT
Start: 2022-01-01 | End: 2022-07-25 | Stop reason: HOSPADM

## 2022-01-01 RX ORDER — MAGNESIUM SULFATE HEPTAHYDRATE 500 MG/ML
INJECTION, SOLUTION INTRAMUSCULAR; INTRAVENOUS
Status: COMPLETED | OUTPATIENT
Start: 2022-01-01 | End: 2022-01-01

## 2022-01-01 RX ORDER — EPINEPHRINE HCL IN 0.9 % NACL 4MG/250ML
0-10 PLASTIC BAG, INJECTION (ML) INTRAVENOUS CONTINUOUS
Status: DISCONTINUED | OUTPATIENT
Start: 2022-01-01 | End: 2022-01-01

## 2022-01-01 RX ORDER — METHYLPREDNISOLONE SODIUM SUCCINATE 125 MG/2ML
125 INJECTION, POWDER, LYOPHILIZED, FOR SOLUTION INTRAMUSCULAR; INTRAVENOUS ONCE
Status: COMPLETED | OUTPATIENT
Start: 2022-01-01 | End: 2022-01-01

## 2022-01-01 RX ADMIN — MIDAZOLAM HYDROCHLORIDE 2 MG: 1 INJECTION INTRAMUSCULAR; INTRAVENOUS at 07:29

## 2022-01-01 RX ADMIN — IPRATROPIUM BROMIDE AND ALBUTEROL SULFATE 3 ML: 2.5; .5 SOLUTION RESPIRATORY (INHALATION) at 07:42

## 2022-01-01 RX ADMIN — POTASSIUM PHOSPHATE, MONOBASIC AND POTASSIUM PHOSPHATE, DIBASIC 15 MMOL: 224; 236 INJECTION, SOLUTION, CONCENTRATE INTRAVENOUS at 09:18

## 2022-01-01 RX ADMIN — Medication 100 MCG: at 22:25

## 2022-01-01 RX ADMIN — ACETAZOLAMIDE 250 MG: 500 INJECTION, POWDER, LYOPHILIZED, FOR SOLUTION INTRAVENOUS at 17:43

## 2022-01-01 RX ADMIN — FUROSEMIDE 40 MG: 10 INJECTION, SOLUTION INTRAMUSCULAR; INTRAVENOUS at 02:27

## 2022-01-01 RX ADMIN — SENNOSIDES AND DOCUSATE SODIUM 2 TABLET: 50; 8.6 TABLET ORAL at 17:43

## 2022-01-01 RX ADMIN — ACETAZOLAMIDE 250 MG: 500 INJECTION, POWDER, LYOPHILIZED, FOR SOLUTION INTRAVENOUS at 06:01

## 2022-01-01 RX ADMIN — ENOXAPARIN SODIUM 40 MG: 40 INJECTION SUBCUTANEOUS at 17:09

## 2022-01-01 RX ADMIN — FAMOTIDINE 20 MG: 10 INJECTION INTRAVENOUS at 05:41

## 2022-01-01 RX ADMIN — FAMOTIDINE 20 MG: 10 INJECTION INTRAVENOUS at 06:10

## 2022-01-01 RX ADMIN — ACETAZOLAMIDE 250 MG: 500 INJECTION, POWDER, LYOPHILIZED, FOR SOLUTION INTRAVENOUS at 09:20

## 2022-01-01 RX ADMIN — SENNOSIDES AND DOCUSATE SODIUM 2 TABLET: 50; 8.6 TABLET ORAL at 05:54

## 2022-01-01 RX ADMIN — FUROSEMIDE 20 MG: 10 INJECTION, SOLUTION INTRAMUSCULAR; INTRAVENOUS at 05:58

## 2022-01-01 RX ADMIN — IPRATROPIUM BROMIDE AND ALBUTEROL SULFATE 3 ML: 2.5; .5 SOLUTION RESPIRATORY (INHALATION) at 02:16

## 2022-01-01 RX ADMIN — FAMOTIDINE 20 MG: 20 TABLET, FILM COATED ORAL at 17:09

## 2022-01-01 RX ADMIN — IPRATROPIUM BROMIDE AND ALBUTEROL SULFATE: .5; 3 SOLUTION RESPIRATORY (INHALATION) at 22:15

## 2022-01-01 RX ADMIN — AZITHROMYCIN MONOHYDRATE 500 MG: 500 INJECTION, POWDER, LYOPHILIZED, FOR SOLUTION INTRAVENOUS at 09:16

## 2022-01-01 RX ADMIN — IOHEXOL 90 ML: 350 INJECTION, SOLUTION INTRAVENOUS at 23:15

## 2022-01-01 RX ADMIN — IPRATROPIUM BROMIDE AND ALBUTEROL SULFATE 3 ML: 2.5; .5 SOLUTION RESPIRATORY (INHALATION) at 23:14

## 2022-01-01 RX ADMIN — ACETAZOLAMIDE 250 MG: 500 INJECTION, POWDER, LYOPHILIZED, FOR SOLUTION INTRAVENOUS at 17:11

## 2022-01-01 RX ADMIN — METHYLPREDNISOLONE SODIUM SUCCINATE 40 MG: 40 INJECTION, POWDER, FOR SOLUTION INTRAMUSCULAR; INTRAVENOUS at 06:11

## 2022-01-01 RX ADMIN — KETAMINE HYDROCHLORIDE 0.3 MG/KG/HR: 50 INJECTION INTRAMUSCULAR; INTRAVENOUS at 03:40

## 2022-01-01 RX ADMIN — FAMOTIDINE 20 MG: 10 INJECTION INTRAVENOUS at 00:10

## 2022-01-01 RX ADMIN — SENNOSIDES AND DOCUSATE SODIUM 2 TABLET: 50; 8.6 TABLET ORAL at 06:21

## 2022-01-01 RX ADMIN — POLYETHYLENE GLYCOL 3350 1 PACKET: 17 POWDER, FOR SOLUTION ORAL at 08:20

## 2022-01-01 RX ADMIN — IPRATROPIUM BROMIDE AND ALBUTEROL SULFATE 3 ML: 2.5; .5 SOLUTION RESPIRATORY (INHALATION) at 14:41

## 2022-01-01 RX ADMIN — CEFTRIAXONE SODIUM 1 G: 10 INJECTION, POWDER, FOR SOLUTION INTRAVENOUS at 06:15

## 2022-01-01 RX ADMIN — FAMOTIDINE 20 MG: 20 TABLET, FILM COATED ORAL at 05:53

## 2022-01-01 RX ADMIN — SENNOSIDES AND DOCUSATE SODIUM 2 TABLET: 50; 8.6 TABLET ORAL at 17:09

## 2022-01-01 RX ADMIN — EPINEPHRINE 1 MG: 0.1 INJECTION, SOLUTION INTRAVENOUS at 22:02

## 2022-01-01 RX ADMIN — EPINEPHRINE 2 MCG/MIN: 1 INJECTION INTRAMUSCULAR; INTRAVENOUS; SUBCUTANEOUS at 05:58

## 2022-01-01 RX ADMIN — CEFTRIAXONE SODIUM 1 G: 10 INJECTION, POWDER, FOR SOLUTION INTRAVENOUS at 08:37

## 2022-01-01 RX ADMIN — ACETAMINOPHEN 650 MG: 325 TABLET, FILM COATED ORAL at 10:36

## 2022-01-01 RX ADMIN — SENNOSIDES AND DOCUSATE SODIUM 2 TABLET: 50; 8.6 TABLET ORAL at 05:41

## 2022-01-01 RX ADMIN — VANCOMYCIN HYDROCHLORIDE 2000 MG: 500 INJECTION, POWDER, LYOPHILIZED, FOR SOLUTION INTRAVENOUS at 01:00

## 2022-01-01 RX ADMIN — ENOXAPARIN SODIUM 40 MG: 40 INJECTION SUBCUTANEOUS at 17:16

## 2022-01-01 RX ADMIN — AMIODARONE HYDROCHLORIDE 150 MG: 1.5 INJECTION, SOLUTION INTRAVENOUS at 21:38

## 2022-01-01 RX ADMIN — FUROSEMIDE 40 MG: 10 INJECTION, SOLUTION INTRAMUSCULAR; INTRAVENOUS at 13:28

## 2022-01-01 RX ADMIN — IPRATROPIUM BROMIDE AND ALBUTEROL SULFATE 3 ML: 2.5; .5 SOLUTION RESPIRATORY (INHALATION) at 19:59

## 2022-01-01 RX ADMIN — FENTANYL CITRATE 100 MCG: 50 INJECTION, SOLUTION INTRAMUSCULAR; INTRAVENOUS at 00:59

## 2022-01-01 RX ADMIN — IPRATROPIUM BROMIDE AND ALBUTEROL SULFATE 3 ML: 2.5; .5 SOLUTION RESPIRATORY (INHALATION) at 02:27

## 2022-01-01 RX ADMIN — FUROSEMIDE 40 MG: 10 INJECTION, SOLUTION INTRAMUSCULAR; INTRAVENOUS at 17:09

## 2022-01-01 RX ADMIN — AZITHROMYCIN MONOHYDRATE 500 MG: 500 INJECTION, POWDER, LYOPHILIZED, FOR SOLUTION INTRAVENOUS at 06:00

## 2022-01-01 RX ADMIN — AMIODARONE HYDROCHLORIDE 0.5 MG/MIN: 1.8 INJECTION, SOLUTION INTRAVENOUS at 15:35

## 2022-01-01 RX ADMIN — ENOXAPARIN SODIUM 40 MG: 40 INJECTION SUBCUTANEOUS at 17:43

## 2022-01-01 RX ADMIN — FENTANYL CITRATE 50 MCG/HR: 50 INJECTION, SOLUTION INTRAMUSCULAR; INTRAVENOUS at 01:30

## 2022-01-01 RX ADMIN — SODIUM BICARBONATE 50 MEQ: 84 INJECTION, SOLUTION INTRAVENOUS at 21:58

## 2022-01-01 RX ADMIN — MAGNESIUM SULFATE HEPTAHYDRATE 2 G: 500 INJECTION, SOLUTION INTRAMUSCULAR; INTRAVENOUS at 22:09

## 2022-01-01 RX ADMIN — IPRATROPIUM BROMIDE AND ALBUTEROL SULFATE 3 ML: 2.5; .5 SOLUTION RESPIRATORY (INHALATION) at 10:31

## 2022-01-01 RX ADMIN — MIDAZOLAM HYDROCHLORIDE 2 MG: 1 INJECTION, SOLUTION INTRAMUSCULAR; INTRAVENOUS at 07:29

## 2022-01-01 RX ADMIN — Medication 100 MCG: at 22:42

## 2022-01-01 RX ADMIN — NOREPINEPHRINE BITARTRATE 20 MCG/MIN: 1 INJECTION, SOLUTION, CONCENTRATE INTRAVENOUS at 22:19

## 2022-01-01 RX ADMIN — VASOPRESSIN 0.03 UNITS/MIN: 20 INJECTION PARENTERAL at 02:27

## 2022-01-01 RX ADMIN — AMIODARONE HYDROCHLORIDE 1 MG/MIN: 1.8 INJECTION, SOLUTION INTRAVENOUS at 03:50

## 2022-01-01 RX ADMIN — METHYLPREDNISOLONE SODIUM SUCCINATE 40 MG: 40 INJECTION, POWDER, FOR SOLUTION INTRAMUSCULAR; INTRAVENOUS at 06:18

## 2022-01-01 RX ADMIN — AZITHROMYCIN MONOHYDRATE 500 MG: 250 TABLET ORAL at 05:53

## 2022-01-01 RX ADMIN — METHYLPREDNISOLONE SODIUM SUCCINATE 125 MG: 125 INJECTION, POWDER, FOR SOLUTION INTRAMUSCULAR; INTRAVENOUS at 02:27

## 2022-01-01 RX ADMIN — AMIODARONE HYDROCHLORIDE 1 MG/MIN: 1.8 INJECTION, SOLUTION INTRAVENOUS at 22:35

## 2022-01-01 RX ADMIN — CEFTRIAXONE SODIUM 1 G: 10 INJECTION, POWDER, FOR SOLUTION INTRAVENOUS at 06:07

## 2022-01-01 RX ADMIN — SODIUM CHLORIDE, POTASSIUM CHLORIDE, SODIUM LACTATE AND CALCIUM CHLORIDE 500 ML: 600; 310; 30; 20 INJECTION, SOLUTION INTRAVENOUS at 01:15

## 2022-01-01 RX ADMIN — Medication 2500 MCG: at 00:06

## 2022-01-01 RX ADMIN — SODIUM BICARBONATE 50 MEQ: 84 INJECTION, SOLUTION INTRAVENOUS at 22:23

## 2022-01-01 RX ADMIN — FENTANYL CITRATE 50 MCG: 50 INJECTION, SOLUTION INTRAMUSCULAR; INTRAVENOUS at 07:14

## 2022-01-01 RX ADMIN — DEXMEDETOMIDINE 0.1 MCG/KG/HR: 200 INJECTION, SOLUTION INTRAVENOUS at 17:51

## 2022-01-01 RX ADMIN — FAMOTIDINE 20 MG: 10 INJECTION INTRAVENOUS at 17:13

## 2022-01-01 RX ADMIN — EPINEPHRINE 1 MG: 0.1 INJECTION, SOLUTION INTRAVENOUS at 21:55

## 2022-01-01 RX ADMIN — PROPOFOL 20 MCG/KG/MIN: 10 INJECTION, EMULSION INTRAVENOUS at 01:30

## 2022-01-01 RX ADMIN — METHYLPREDNISOLONE SODIUM SUCCINATE 40 MG: 40 INJECTION, POWDER, FOR SOLUTION INTRAMUSCULAR; INTRAVENOUS at 05:42

## 2022-01-01 RX ADMIN — PIPERACILLIN AND TAZOBACTAM 4.5 G: 4; .5 INJECTION, POWDER, FOR SOLUTION INTRAVENOUS at 23:54

## 2022-01-01 ASSESSMENT — ENCOUNTER SYMPTOMS
WEIGHT LOSS: 1
SHORTNESS OF BREATH: 1
DIARRHEA: 0
DIZZINESS: 0
EYE PAIN: 0
NERVOUS/ANXIOUS: 0
BACK PAIN: 0
EYE DISCHARGE: 0
NAUSEA: 0
HEADACHES: 0
FLANK PAIN: 0
WEAKNESS: 1
HEMOPTYSIS: 1
SORE THROAT: 0
ABDOMINAL PAIN: 0
NECK PAIN: 0
BRUISES/BLEEDS EASILY: 0
DEPRESSION: 0
VOMITING: 0
PALPITATIONS: 0
SPUTUM PRODUCTION: 1
COUGH: 0
CHILLS: 0
COUGH: 1
FEVER: 0

## 2022-01-01 ASSESSMENT — LIFESTYLE VARIABLES
TOTAL SCORE: 0
DOES PATIENT WANT TO STOP DRINKING: CANNOT ASSESS
HAVE PEOPLE ANNOYED YOU BY CRITICIZING YOUR DRINKING: NO
HAVE YOU EVER FELT YOU SHOULD CUT DOWN ON YOUR DRINKING: NO
AVERAGE NUMBER OF DAYS PER WEEK YOU HAVE A DRINK CONTAINING ALCOHOL: 0
TOTAL SCORE: 0
EVER HAD A DRINK FIRST THING IN THE MORNING TO STEADY YOUR NERVES TO GET RID OF A HANGOVER: NO
HOW MANY TIMES IN THE PAST YEAR HAVE YOU HAD 5 OR MORE DRINKS IN A DAY: 0
ON A TYPICAL DAY WHEN YOU DRINK ALCOHOL HOW MANY DRINKS DO YOU HAVE: 0
EVER FELT BAD OR GUILTY ABOUT YOUR DRINKING: NO
CONSUMPTION TOTAL: NEGATIVE
ALCOHOL_USE: NO
TOTAL SCORE: 0

## 2022-01-01 ASSESSMENT — COGNITIVE AND FUNCTIONAL STATUS - GENERAL
DRESSING REGULAR LOWER BODY CLOTHING: A LOT
TOILETING: A LOT
DRESSING REGULAR UPPER BODY CLOTHING: TOTAL
MOBILITY SCORE: 16
SUGGESTED CMS G CODE MODIFIER MOBILITY: CK
PERSONAL GROOMING: A LOT
MOVING FROM LYING ON BACK TO SITTING ON SIDE OF FLAT BED: UNABLE
TURNING FROM BACK TO SIDE WHILE IN FLAT BAD: A LITTLE
STANDING UP FROM CHAIR USING ARMS: A LOT
HELP NEEDED FOR BATHING: A LOT
MOVING TO AND FROM BED TO CHAIR: A LITTLE
SUGGESTED CMS G CODE MODIFIER DAILY ACTIVITY: CL
DAILY ACTIVITIY SCORE: 13
WALKING IN HOSPITAL ROOM: A LITTLE

## 2022-01-01 ASSESSMENT — PAIN DESCRIPTION - PAIN TYPE
TYPE: ACUTE PAIN

## 2022-01-01 ASSESSMENT — PATIENT HEALTH QUESTIONNAIRE - PHQ9
1. LITTLE INTEREST OR PLEASURE IN DOING THINGS: NOT AT ALL
1. LITTLE INTEREST OR PLEASURE IN DOING THINGS: NOT AT ALL
2. FEELING DOWN, DEPRESSED, IRRITABLE, OR HOPELESS: NOT AT ALL
SUM OF ALL RESPONSES TO PHQ9 QUESTIONS 1 AND 2: 0
2. FEELING DOWN, DEPRESSED, IRRITABLE, OR HOPELESS: NOT AT ALL
SUM OF ALL RESPONSES TO PHQ9 QUESTIONS 1 AND 2: 0

## 2022-01-01 ASSESSMENT — FIBROSIS 4 INDEX
FIB4 SCORE: 1.86
FIB4 SCORE: 2.01
FIB4 SCORE: 2.84

## 2022-01-01 ASSESSMENT — PULMONARY FUNCTION TESTS
FVC: 0.72
FVC: 0.9

## 2022-07-21 PROBLEM — I46.9 CARDIAC ARREST (HCC): Status: ACTIVE | Noted: 2022-01-01

## 2022-07-22 PROBLEM — J96.21 ACUTE ON CHRONIC RESPIRATORY FAILURE WITH HYPOXIA AND HYPERCAPNIA (HCC): Status: ACTIVE | Noted: 2022-01-01

## 2022-07-22 PROBLEM — J96.22 ACUTE ON CHRONIC RESPIRATORY FAILURE WITH HYPOXIA AND HYPERCAPNIA (HCC): Status: ACTIVE | Noted: 2022-01-01

## 2022-07-22 PROBLEM — J98.11 ATELECTASIS OF LEFT LUNG: Status: ACTIVE | Noted: 2022-01-01

## 2022-07-22 PROBLEM — E87.1 HYPONATREMIA: Status: ACTIVE | Noted: 2022-01-01

## 2022-07-22 PROBLEM — J90 PLEURAL EFFUSION ON LEFT: Status: ACTIVE | Noted: 2022-01-01

## 2022-07-22 PROBLEM — J43.2 CENTRILOBULAR EMPHYSEMA (HCC): Status: ACTIVE | Noted: 2022-01-01

## 2022-07-22 NOTE — PROGRESS NOTES
0045 Report received from KINDRA Wills. Assumed care of pt. Dr. hKan at bedside, multiple verbal orders received. A-line placed w/o issue.    0115 Pt suctioned/lavagued by RT, copious bloody/blood-tinged sputum returned. Pt desated to mid 75s, hypotensive to MAPs in the 40s. Bill WILCOX notified and at bedside. Pt turned on L side. Gtts titrated as appropriate to support resp status, BP.    0200 Pt con't to sat 82-88%, L side up, RT, MD at bedside. Lasix, high dose steroids given. EKG completed.    0300 EEG, ECHO completed at bedside. Pt tolerated turning to R side up for ECHO.    0415 Dr. Khan at bedside, pt positioned and prepped for thoracentesis. Time out completed. Thoracentesis completed w/o acute event. 1.6L removed, specimens sent to lab.    0515 When repositioning pt post tap, pt opened eyes spontaneously, followed commands, and moved all extremities. MD notified.    0600 Epi started, levo weened off.

## 2022-07-22 NOTE — ASSESSMENT & PLAN NOTE
Intubated on 7/21 in the field  Cont full vent support  RT/O2 protocols  Vent bundle protocols  Lung protective ventilator strategies  Steroids/Bronchodilators  Bronchoscopy with BAL/cytology on 7/22, cont Ceftriaxone/azithromycin  SAT/SBTs-->extubated to high flow   High chance of failure and needing reintubation  Cont aggressive diuresis

## 2022-07-22 NOTE — PROGRESS NOTES
Received report and assumed pt care. Pt is intubated and sedated. Bilateral sift wrist restraints in use. Lines and gtts verified. Dr. Barbosa at bedside, plan for bedside bronch shortly.

## 2022-07-22 NOTE — ED NOTES
Pt transported to the floor via gurney by ACLS RN x3, RT to [facility]. Pt intubated at this time. Pt belongings and paper work sent with patient.

## 2022-07-22 NOTE — HOSPITAL COURSE
Mr. Greenwood is a 77 year old male with the past medical history of COPD on home O2 who had reportedly complained of worsening shortness of breath for the last 1-2 weeks that acutely worsened in the last 2 days prior to admission. EMS was activated and found the patient lethargic with low O2 sats.  He was placed on BiPAP without improvement of O2 sats and intubated in the field. Upon arrival to the ER, the patient had a PEA cardiac arrest requiring 2 rounds of CPR/epi.  He was found to have a large left sided pleural effusion with compressive atelectasis.  He was admitted to the ICU.  He did not need hypothermia protocols as he was following commands.

## 2022-07-22 NOTE — ASSESSMENT & PLAN NOTE
PEA cardiac arrest likely due to hypoxia  S/p ACLS/CPR/epi  No need for hypothermia protocols  Supportive care

## 2022-07-22 NOTE — ED NOTES
Unable to complete Med Rec at this time. Pt unable to participate. No information in Demographics. No Home Pharmacy listed.

## 2022-07-22 NOTE — CARE PLAN
The patient is Unstable - High likelihood or risk of patient condition declining or worsening    Shift Goals  Clinical Goals: stable BP, improve pulm fx, thorac, bronch maybe  Patient Goals: MAEVE  Family Goals: MAEVE    Progress made toward(s) clinical / shift goals:        Problem: Knowledge Deficit - Standard  Goal: Patient and family/care givers will demonstrate understanding of plan of care, disease process/condition, diagnostic tests and medications  Outcome: Progressing     Problem: Knowledge Deficit - COPD  Goal: Patient/significant other demonstrates understanding of disease process, utilization of the Action Plan, medications and discharge instruction  Outcome: Progressing     Problem: Risk for Infection - COPD  Goal: Patient will remain free from signs and symptoms of infection  Outcome: Progressing     Problem: Nutrition - Advanced  Goal: Patient will display progressive weight gain toward goal have adequate food and fluid intake  Outcome: Progressing     Problem: Ineffective Airway Clearance  Goal: Patient will maintain patent airway with clear/clearing breath sounds  Outcome: Progressing     Problem: Impaired Gas Exchange  Goal: Patient will demonstrate improved ventilation and adequate oxygenation and participate in treatment regimen within the level of ability/situation.  Outcome: Progressing     Problem: Risk for Aspiration  Goal: Patient's risk for aspiration will be absent or decrease  Outcome: Progressing     Problem: Self Care  Goal: Patient will have the ability to perform ADLs independently or with assistance (bathe, groom, dress, toilet and feed)  Outcome: Progressing     Problem: Safety - Medical Restraint  Goal: Remains free of injury from restraints (Restraint for Interference with Medical Device)  Outcome: Progressing  Goal: Free from restraint(s) (Restraint for Interference with Medical Device)  Outcome: Progressing     Problem: Skin Integrity  Goal: Skin integrity is maintained or  improved  Outcome: Progressing     Problem: Fall Risk  Goal: Patient will remain free from falls  Outcome: Progressing

## 2022-07-22 NOTE — ED NOTES
Pt arrives via [facility]. Per [facility] pt began to experience sever SOB/COPD exacerbation at 1300. Pt received 2 breathing treatments in route and CPAP with no relief. Pt intubated PTA. Upon transferring to ED [facility] pt was assessed to be pulseless. CPR started at 2154. See Code narrator.

## 2022-07-22 NOTE — PROGRESS NOTES
"[facility] ICU Progress Note      Admit Date: 7/21/2022    Resident(s): Jesse Meyer M.D.   Attending:  TRISTA KAPLAN/ Dr. Reva Barbosa    Patient ID:    Name:  Aneudy Greenwood     YOB: 1945  Age:  77 y.o.  male   MRN:  [id number]    Hospital Course (carried forward and updated):  Patient is a 76yo male with COPD and CHRF that was originally complaining of dyspnea for 1-2 weeks with significant worsening 1-2 days prior. Patient was found by EMS and immediately required intubation, as unable to maintain sats on BiPAP. Arrived at ED and went into cardiac arrest. ACLS was initiated, ROSC achieved, and R IJ placed with initiation of norepi for pressure support. EEG performed showing diffuse background slowing.    On admission: CXR showing pulm edema, b/l pleural effusions, and L superior mediastinal contour enlargement. CT head neg. CTA chest/abd showing L pleural effusion, MAYCO collapse, emphysematous changes, R mediastinal shift, and b/l basilar densities. TTE showing EF 70%, enlarged RA and RV with \"D sign,\" and dilated IVC. Trop 76. . D-dimer 3.83. Procal 0.14. ABG showing resp acidosis. WBC 18.5. Na 128. CO2 36. LA 2.4. UA neg.    Received Lasix 40mg IV x1, LR 500mL x1, methylpred 125mg IV x1, Vanc/Zosyn x1, and 2 amps bicarb. Underwent thoracentesis with removal of 1.6L of bloody fluid; sent for analysis and cytology.    Consultants:  Critical Care  Neurology    Interval Events:  07/22: Intubated. RR 20, , PEEP 14, FiO2 90%. Bronchoscopy performed at bedside; BAL sent for analysis and cytology. DVT US neg. Pleural fluid analysis showing exudative effusion, likely malignancy.    NEURO: Intubated and sedated  CARDIOVASC: HR 70s-100s, BP 70s-130s/40s-60s  RESPIRATORY: RR 9-80  GI/NUTRITION: NPO  RENAL/FLUID/LYTES:  HEME/ONC: WBC 21.2  INFECTIOUS D: C3/azithro  ENDOCRINE:    Vitals Range last 24h:  Temp:  [32.5 °C (90.5 °F)-38.3 °C (100.9 °F)] 38.2 °C (100.8 °F)  Pulse:  [0-109] 107  Resp:  " [7-80] 30  BP: ()/(39-89) 95/50  SpO2:  [77 %-100 %] 95 %      Intake/Output Summary (Last 24 hours) at 7/22/2022 1403  Last data filed at 7/22/2022 1200  Gross per 24 hour   Intake 2414.18 ml   Output 1000 ml   Net 1414.18 ml        Review of Systems   Unable to perform ROS: Intubated       PHYSICAL EXAM:  Vitals:    07/22/22 1000 07/22/22 1100 07/22/22 1200 07/22/22 1300   BP: (!) 90/52 (!) 97/56 (!) 92/55 (!) 95/50   Pulse: 87 100 (!) 102 (!) 107   Resp: 20 20 (!) 7 (!) 30   Temp:   (!) 38.2 °C (100.8 °F)    TempSrc:       SpO2: 100% 97% 97% 95%   Weight:       Height:        Body mass index is 28.03 kg/m².    O2 therapy: Pulse Oximetry: 95 %, O2 Delivery Device: Ventilator    Date 07/22/22 0700 - 07/23/22 0659   Shift 9394-7641 7535-9038 1416-5100 24 Hour Total   INTAKE   I.V. 464.1   464.1     Magnesium Sulfate Volume 0   0     Ketamine Volume 67.3   67.3     Vasopressin Volume 65.4   65.4     Propofol Volume 56   56     Norepinephrine Volume 211.6   211.6     Epinephrine Volume 63.9   63.9   NG/   100     Intake (mL) (Enteral Tube 07/21/22 Orogastric 16 Fr. Oral) 100   100   IV Piggyback 850.1   850.1     Volume (mL) (piperacillin-tazobactam (Zosyn) 4.5 g in  mL IVPB) 100   100     Volume (mL) (vancomycin (VANCOCIN) 2,000 mg in  mL IVPB) 500.1   500.1     Volume (mL) (azithromycin (ZITHROMAX) 500 mg in D5W 250 mL IVPB premix) 250   250   Shift Total 1414.2   1414.2   OUTPUT   Urine 400   400     Output (mL) (Urethral Catheter Temperature probe 16 Fr.) 400   400   Emesis/NG output 0   0     Output (mL) (Enteral Tube 07/21/22 Orogastric 16 Fr. Oral) 0   0   Shift Total 400   400   NET 1014.2   1014.2        Physical Exam  Constitutional:       Appearance: He is ill-appearing.   HENT:      Head: Normocephalic and atraumatic.      Mouth/Throat:      Comments: ETT  OG tube  Cardiovascular:      Rate and Rhythm: Normal rate and regular rhythm.      Heart sounds: Normal heart sounds. No murmur  heard.    No friction rub. No gallop.   Pulmonary:      Effort: No respiratory distress.      Breath sounds: No wheezing, rhonchi or rales.      Comments: Diminished breath sounds, L>R  Abdominal:      General: There is distension.      Palpations: Abdomen is soft.      Comments: Umbilical hernia   Musculoskeletal:      Right lower leg: Edema (1+) present.      Left lower leg: Edema (1+) present.   Skin:     General: Skin is warm and dry.      Coloration: Skin is not jaundiced.         Recent Labs     07/22/22  0141 07/22/22  0221 07/22/22 0225 07/22/22  0527   ISTATAPH 7.242*  --  7.215* 7.334*   ISTATAPCO2 81.1*  --  86.6* 61.8*   ISTATAPO2 50*  --  56* 73   ISTATATCO2 37*  --  38* 35*   VPLKBTX0ULR 76*  --  80* 93   ISTATARTHCO3 34.9*  --  35.1* 32.9*   ISTATARTBE 4*  --  5* 5*   ISTATTEMP 35.8 C 36.0 C 36.0 C 37.8 C   ISTATFIO2 100 100 100 90   ISTATSPEC Arterial Venous Arterial Arterial   ISTATAPHTC 7.258*  --  7.229* 7.322*   [id number] 46*  --  52* 77     Recent Labs     07/21/22 2228 07/22/22  0245   SODIUM 128* 128*   POTASSIUM 4.2 4.8   CHLORIDE 85* 89*   CO2 36* 29   BUN 15 17   CREATININE 0.70 0.57   MAGNESIUM 3.3* 2.5   PHOSPHORUS 4.9* 4.0   CALCIUM 8.7 8.2*     Recent Labs     07/21/22 2228 07/22/22  0245   ALTSGPT 31  --    ASTSGOT 36  --    ALKPHOSPHAT 99  --    TBILIRUBIN 0.5  --    GLUCOSE 224* 168*     Recent Labs     07/21/22 2228 07/22/22  0210 07/22/22  0245   RBC 4.58*  --  4.75   HEMOGLOBIN 14.5  --  14.8   HEMATOCRIT 44.3  --  44.8   PLATELETCT 248  --  268   PROTHROMBTM  --  13.9  --    INR  --  1.08  --      Recent Labs     07/21/22  2228 07/22/22  0245   WBC 18.5* 21.2*   NEUTSPOLYS 87.00* 90.40*   LYMPHOCYTES 6.90* 0.90*   MONOCYTES 3.50 8.70   EOSINOPHILS 0.00 0.00   BASOPHILS 0.00 0.00   ASTSGOT 36  --    ALTSGPT 31  --    ALKPHOSPHAT 99  --    TBILIRUBIN 0.5  --        Meds:  • ipratropium-albuterol  3 mL     • ipratropium-albuterol  3 mL     • methylPREDNISolone  40 mg     •  fentaNYL   mcg/hr Stopped (07/22/22 0930)   • vasopressin (PITRESSIN) infusion  0.03 Units/min Stopped (07/22/22 0943)   • azithromycin (ZITHROMAX) IV  500 mg Stopped (07/22/22 0700)   • ketamine  0-2.5 mg/kg/hr (Adjusted) Stopped (07/22/22 0931)   • enoxaparin (LOVENOX) injection  40 mg     • cefTRIAXone (ROCEPHIN) IV  1 g     • acetaminophen  650 mg     • furosemide  40 mg     • norepinephrine (Levophed) infusion  0-40 mcg/min 2 mcg/min (07/22/22 1402)   • Respiratory Therapy Consult       • famotidine  20 mg      Or   • famotidine  20 mg     • senna-docusate  2 Tablet      And   • polyethylene glycol/lytes  1 Packet      And   • magnesium hydroxide  30 mL      And   • bisacodyl  10 mg     • MD Alert...Adult ICU Electrolyte Replacement per Pharmacy       • lidocaine  2 mL          Procedures:  07/22 bronchoscopy with BAL    Imaging:  US-EXTREMITY VENOUS LOWER BILAT   Final Result      DX-CHEST-PORTABLE (1 VIEW)   Final Result         1.  Pulmonary edema and/or infiltrate, stable since prior study.   2.  Small bilateral pleural effusion.   3.  Enlargement of the left superior mediastinal contour, compatible with upper lobe collapse on recent CT.   4.  Cardiomegaly      EC-ECHOCARDIOGRAM COMPLETE W/O CONT   Final Result      DX-CHEST-PORTABLE (1 VIEW)   Final Result         1.  Pulmonary edema and/or infiltrate.   2.  Moderate left and small right pleural effusion.   3.  Enlargement of the left superior mediastinal contour, compatible with upper lobe collapse on recent CT.   4.  Cardiomegaly      CT-CTA COMPLETE THORACOABDOMINAL AORTA   Final Result         1.  No aortic aneurysm or dissection identified.   2.  Large layering left pleural effusion   3.  Collapse of the left upper lobe without visualized bronchograms, could represent endobronchial obstruction from mucous or endobronchial lesion. Recommend bronchoscopy for further evaluation.   4.  Rightward shift of the mediastinum, likely due to large left  effusion.   5.  Extensive emphysema   6.  Fat density mass in the duodenal lumen, appearance suggests lipoma.   7.  Diverticulosis   8.  Fat-containing umbilical hernia   9.  Atherosclerosis and atherosclerotic coronary artery disease      CT-HEAD W/O   Final Result         1.  No acute intracranial abnormality is identified, there are nonspecific white matter changes, commonly associated with small vessel ischemic disease.  Associated mild cerebral atrophy is noted.   2.  Atherosclerosis.         DX-CHEST-PORTABLE (1 VIEW)   Final Result         1.  Pulmonary edema and/or infiltrate.   2.  Moderate left and small right pleural effusion.   3.  Enlargement of the left superior mediastinal contour, appearance suggests thoracic aortic aneurysm. Could be further evaluated with CT angiogram of the chest for evaluation of the aorta.   4.  Cardiomegaly   5.  Nasogastric tube terminates at the gastroesophageal junction, recommend a patent      These findings were discussed with the patient's clinician, Jung Hall, on 7/21/2022 10:28 PM.          ASSESSEMENT and PLAN:    * Cardiac arrest (HCC)- (present on admission)  Assessment & Plan  PEA cardiac arrest likely due to hypoxia  S/p ACLS/CPR/epi  No need for hypothermia protocols  Supportive care    Acute on chronic respiratory failure with hypoxia and hypercapnia (HCC)- (present on admission)  Assessment & Plan  Intubated on 7/21 in the field  Cont full vent support  RT/O2 protocols  Vent bundle protocols  Lung protective ventilator strategies  Steroids/Bronchodilators  Bronchoscopy with BAL/cytology today, cont Ceftriaxone/azithromycin  SAT/SBTs    Hyponatremia- (present on admission)  Assessment & Plan  ?SIADH related to lung disease  Following     Centrilobular emphysema (HCC)- (present on admission)  Assessment & Plan  Noted on CT chest today  Steroids/Bronchodilators  RT/O2 protocols  On home O2, no documentation of pulmonary visits or PFTs      Atelectasis of  left lung- (present on admission)  Assessment & Plan  Compressive, ?obstructive-->?underlying mass  S/p bronchoscopy with BAL on 7/22 with suspicious mucosa to MAYCO  Cont ceftriaxone/azithromycin  Follow cultures and cytology from BAL  Will need repeat bronch and brushings/biopsies    Pleural effusion on left- (present on admission)  Assessment & Plan  S/p thoracentesis on 7/22 with 1.6 liters removed, bloody fluid  Concerning for underlying malignancy  Follow cultures, cytology        CODE STATUS: FULL CODE    Quality Measures:  Feeding: NPO  Analgesia: fentanyl, Tylenol  Sedation: fentanyl, ketamine  Thromboprophylaxis: enoxaparin  Head of bed: >30 degrees  Ulcer prophylaxis: famotidine  Glycemic control: none  Bowel care: bowel regimen  Indwelling lines: R IJ, R radial art  Deescalation of antibiotics: C3/reymundo Meyer M.D.

## 2022-07-22 NOTE — ASSESSMENT & PLAN NOTE
S/p thoracentesis on 7/22 with 1.6 liters removed, bloody fluid  Concerning for underlying malignancy  Follow cultures, cytology  Cytology showing numerous malignant cells consistent with carcinoma, staining to follow to assist with origin, but suspect primary lung and stage 4

## 2022-07-22 NOTE — DISCHARGE PLANNING
Referral: Acute Medical Patient Response    Intervention: SW responded to an acute medical patient.  Pt was BIB [facility] after COPD exacerbation.  Pt was intubated upon arrival.  Pts name is Aneudy Greenwood (: 1945).  SW obtained the following pt information: Per EMS report Pt was SOB and was intubated in route to the hospital. Pt's Spouse Devorah arrived shortly after the Pt as well the their three adult children.  SW was present while the ERP provided them an update and SW escorted Pt's DTR, Barbie and Granddaughter back to be with the Pt.    SPOUSE: Devorah Greenwood [phone]    Plan: SW will continue to monitor and assist if needs arise.

## 2022-07-22 NOTE — PROGRESS NOTES
4 Eyes Skin Assessment Completed by KINDRA Wills and KINDRA Berger.    Head Abrasion on top of head    Ears WDL  Nose WDL  Mouth Redness  Neck WDL  Breast/Chest WDL  Shoulder Blades WDL  Spine WDL  (R) Arm/Elbow/Hand Redness and Blanching  (L) Arm/Elbow/Hand Redness and Blanching  Abdomen WDL  Groin WDL  Scrotum/Coccyx/Buttocks Redness and Blanching  (R) Leg WDL  (L) Leg WDL  (R) Heel/Foot/Toe Redness and Blanching  (L) Heel/Foot/Toe Redness and Blanching          Devices In Places ECG, Blood Pressure Cuff, Pulse Ox, Hurt, Arterial Line, ET Tube, OG/NG and Central Line      Interventions In Place Waffle Overlay, Pillows, Q2 Turns and Low Air Loss Mattress    Possible Skin Injury No    Pictures Uploaded Into Epic N/A  Wound Consult Placed N/A  RN Wound Prevention Protocol Ordered Yes

## 2022-07-22 NOTE — CARE PLAN
Problem: Ventilation  Goal: Ability to achieve and maintain unassisted ventilation or tolerate decreased levels of ventilator support  Description: Target End Date:  4 days     Document on Vent flowsheet    1.  Support and monitor invasive and noninvasive mechanical ventilation  2.  Monitor ventilator weaning response  3.  Perform ventilator associated pneumonia prevention interventions  4.  Manage ventilation therapy by monitoring diagnostic test results  Outcome: Not Met      Ventilator Daily Summary    Vent Day #2    Ventilator settings changed this shift: APV 20, 380, +14, 100%    Weaning trials: none    Respiratory Procedures: none    Plan: Continue current ventilator settings and wean mechanical ventilation as tolerated per physician orders.

## 2022-07-22 NOTE — ASSESSMENT & PLAN NOTE
Noted on CT chest   Steroids/Bronchodilators  RT/O2 protocols  On home O2, no documentation of pulmonary visits or PFTs  Suspect end stage

## 2022-07-22 NOTE — ASSESSMENT & PLAN NOTE
Compressive, ?obstructive-->?underlying mass  S/p bronchoscopy with BAL on 7/22 with suspicious mucosa to MAYCO  Cont ceftriaxone/azithromycin  Cultures negative,BAL cytology still not reported, pleural effusion showing numerous malignant cells consistent with stage 4 cancer diagnosis

## 2022-07-22 NOTE — CONSULTS
Critical Care/Pulmonary Consultation    Date of Service: 7/21/2022    Date of Admission:  7/21/2022  9:55 PM    Consulting Physician: Maranda Khan M.D.    Chief Complaint: Dyspnea    History of Present Illness: Aneudy Greenwood is a 77 y.o. male with a past medical history of COPD who presented to the hospital by EMS with severe dyspnea.  At point of EMS arrival to provide transportation, patient hardly able to maintain consciousness.  Breathing treatments were provided under suspicion of COPD exacerbation.  Patient went into PEA at point of arrival and he was resuscitated. Ultimately was intubated (ketamine, fentayl, and versed).    Spoke to family who stated that he has been dyspneic for past week.  Wife states that he reported to pulmonologist (Dr. Simmons?  [facility]) days ago, who, over the phone, advised him to take his previously prescribed Lasix that he has not been taking.  Wife reports that he has had an intermittent productive cough, no fever, no chills, no hemoptysis.   No recent sickness in himself nor others.  Patient reportedly leaves home per family.  On subjective past medical history, wife reports that he saw cardiologist a year ago and was told that his cardiac function was normal.  He has seen above-noted pulmonologist who has wanted to do further work-up on him, but patient has declined.  Family states that they would like him to be full code and that patient would be willing to do the diagnostic measures.  He has, in the past, spoken to the effect of not wanting to be kept alive if his baseline functions are not unable to be restored.    Review of Systems   Unable to perform ROS: Acuity of condition       Home Medications     Reviewed by Billy Still (Pharmacy Tech) on 07/21/22 at 5868  Med List Status: Unable to Obtain   Medication Last Dose Status        Patient Ariel Taking any Medications                           Past medical history  "noted above      No past surgical history on file.    Allergies: Patient has no allergy information on record.    No family history on file.    Vitals:    07/21/22 2156 07/21/22 2233   Height: 1.753 m (5' 9\")    Weight: 77.1 kg (170 lb)    Weight % change since last entry.: 0 %    Pulse: (!) 0 85   BMI (Calculated): 25.1    Resp:  (!) 28         Physical Exam  Constitutional:       General: He is not in acute distress.     Appearance: He is not diaphoretic.   HENT:      Head: Normocephalic and atraumatic.   Eyes:      Conjunctiva/sclera: Conjunctivae normal.   Neck:      Thyroid: No thyromegaly.      Trachea: No tracheal deviation.   Cardiovascular:      Rate and Rhythm: Regular rhythm.      Heart sounds: Normal heart sounds. No murmur heard.    No friction rub.   Pulmonary:      Breath sounds: No wheezing or rales.      Comments: Intubated  Abdominal:      General: There is no distension.      Tenderness: There is no abdominal tenderness.      Comments: Significant distention, no palpable masses on my exam   Genitourinary:     Penis: No discharge.       Rectum: Guaiac result negative.   Musculoskeletal:         General: No tenderness or deformity.      Cervical back: Normal range of motion and neck supple.      Right lower leg: Edema present.      Left lower leg: Edema present.   Skin:     Findings: No erythema or rash.      Comments: Cold, dry   Neurological:      Cranial Nerves: No cranial nerve deficit.      Coordination: Coordination normal.         No intake or output data in the 24 hours ending 07/21/22 4303        No results for input(s): SODIUM, POTASSIUM, CHLORIDE, CO2, BUN, CREATININE, MAGNESIUM, PHOSPHORUS, CALCIUM in the last 72 hours.  No results for input(s): ALTSGPT, ASTSGOT, ALKPHOSPHAT, TBILIRUBIN, DBILIRUBIN, GAMMAGT, AMYLASE, LIPASE, ALB, PREALBUMIN, GLUCOSE in the last 72 hours.      DX-CHEST-PORTABLE (1 VIEW)   Final Result         1.  Pulmonary edema and/or infiltrate.   2.  Moderate left and " small right pleural effusion.   3.  Enlargement of the left superior mediastinal contour, appearance suggests thoracic aortic aneurysm. Could be further evaluated with CT angiogram of the chest for evaluation of the aorta.   4.  Cardiomegaly   5.  Nasogastric tube terminates at the gastroesophageal junction, recommend a patent      These findings were discussed with the patient's clinician, Jung Hall, on 7/21/2022 10:28 PM.      CT-CTA COMPLETE THORACOABDOMINAL AORTA    (Results Pending)   CT-HEAD W/O    (Results Pending)       Patient Active Problem List   Diagnosis   • Cardiac arrest (HCC)       Assessment and Plan:  Acute hypoxia with respiratory failure (present on admission)  Assessment & Plan  Intubation date: 7/21 (while in ED)  2/2 COPD vs/CHF exacerbation vs pleural effusion and/vs endobronchial obstruction (mucous vs lesion)  Monitor ventilator waveforms and titrate flow/peep and volumes according.   Lung protective ventilation strategy w/ A-F bundle  CXR: monitor lung volumes and tube/line placement  VAP bundle prevention, oral care, post pyloric feeding  Head of bed > 30 degree  GI prophylaxis  Daily awakening and SBT trials unless contraindicated  Monitor for liberation  Respiratory treatments: prn  Thoracentesis and bronch    Pleural effusion (present on admission)  Assessment & Plan  High suspicion that this is 2/2 malignancy, based on CT  Plan as above  Follow for thora results    Congestive heart failure (present on admission)  Assessment & Plan  Unclear hx of , per family on home lasix for overload and saw cardiology a year ago where wife reports was told that cardiac functions were normal  Follow up echo  Careful with fluid resuscitation measures

## 2022-07-22 NOTE — ED PROVIDER NOTES
ED Provider Note    Scribed for Jung Hall by Jc Hicks. 7/21/2022  10:16 PM    Primary care provider: None noted.  Means of arrival: EMS  History obtained from: Patient  History limited by: None    CHIEF COMPLAINT  Chief Complaint   Patient presents with   • COPD     HPI  Aneudy Greenwood is a 77 y.o. male who presents to the Emergency Department for COPD exacerbation onset 10 hours ago. Per EMT, the patient used an inhaler 10 times with no relief. The patient had never had an episode this bad before. When EMS arrived at scene, the patient was barely holding his head up. EMS treid to treat patient with Duoneb, albuterol, and BiPAP, none of which woke the patient up. Patient was intubated with Ketamine, Fentanyl, and Versed. His pulse oximetry was 90.  History is very limited otherwise as patient arrives intubated.    REVIEW OF SYSTEMS  As above, all other systems reviewed and are negative.   See HPI for further details.     PAST MEDICAL HISTORY    None noted.   SURGICAL HISTORY  patient denies any surgical history  SOCIAL HISTORY      Social History     Substance and Sexual Activity   Drug Use Not on file     FAMILY HISTORY  None noted.  CURRENT MEDICATIONS  No current outpatient medications    ALLERGIES  Not on File    PHYSICAL EXAM    VITAL SIGNS:   Vitals:    07/21/22 2355 07/22/22 0005 07/22/22 0008 07/22/22 0011   BP: (!) 97/59 (!) 96/59 101/58 105/58   Pulse: 94 94 95 94   Resp: 16 15  16   Temp:       TempSrc:       SpO2: 91% 91% 88% 89%   Weight:       Height:           Vitals: My interpretation: Hypotensive, not tachycardic, afebrile, not hypoxic    Reinterpretation of vitals: Improving    Cardiac Monitor Interpretation: The cardiac monitor revealed normal Sinus Rhythm as interpreted by me. The cardiac monitor was ordered secondary to the patient's history of cardiac arrest and to monitor for dysrhythmia and/or tachycardia.    PE:   Constitutional: Cyanotic, critically ill-appearing, GCS of 3,  intubated  HENT: Normocephalic, Atraumatic, Bilateral external ears normal, Oropharynx is clear mucous membranes are moist. No oral exudates or nasal discharge.   Eyes: Pupils are equal round and unreactive, not following or tracking with his eyes, Conjunctiva normal, No discharge.   Neck: Normal range of motion, Supple, No stridor. No meningismus.  Lymphatic: No lymphadenopathy noted.   Cardiovascular: No pulse  Thorax & Lungs: Rhonchorous breath sounds when being bagged  Abdomen: Soft non-distended. There is no rebound or guarding. No organomegaly is appreciated. Bowel sounds are normal.  Skin: Normal without rash.   Back: No CVA or spinal tenderness.   Extremities:No edema, No tenderness, No cyanosis, No clubbing.   Musculoskeletal: Grossly normal  Neurologic: Intubated, GCS of 3     DIAGNOSTIC STUDIES / PROCEDURES  CPR overseen by myself, Dr. Hall  Indication: cardiac arrest  Consent: Unable to be obtained due to the emergent nature of this procedure.  Pre-Medication: Unable to provide pre-medication due to the emergent nature of this procedure.  Procedure: The patient was placed in the supine position and the chest area was exposed.  Constant chest compressions were done with appropriate depth and pace  The patient tolerated the procedure well  Complications: bruising      Central Line Placement Procedure Note  Indication: vascular access, poor peripheral access, hypovolemia and centrally administered medications  Consent: Unable to be obtained due to the emergent nature of this procedure.  Procedure: The patient was positioned appropriately and the skin over the right internal jugular vein was prepped with betadine and draped in a sterile fashion. Local anesthesia was not performed due to the emergent nature of this procedure.  A large bore needle was used to identify the vein.  A guide wire was then inserted into the vein through the needle. A triple lumen catheter was then inserted into the vessel over  the guide wire using the Seldinger technique.  All ports showed good, free flowing blood return and were flushed with saline solution.  The catheter was then securely fastened to the skin with sutures and covered with a sterile dressing.  A post procedure X-ray was not indicated.  The patient tolerated the procedure well.  Complications: None    LABS  Results for orders placed or performed during the hospital encounter of 07/21/22   CBC WITH DIFFERENTIAL   Result Value Ref Range    WBC 18.5 (H) 4.8 - 10.8 K/uL    RBC 4.58 (L) 4.70 - 6.10 M/uL    Hemoglobin 14.5 14.0 - 18.0 g/dL    Hematocrit 44.3 42.0 - 52.0 %    MCV 96.7 81.4 - 97.8 fL    MCH 31.7 27.0 - 33.0 pg    MCHC 32.7 (L) 33.7 - 35.3 g/dL    RDW 50.3 (H) 35.9 - 50.0 fL    Platelet Count 248 164 - 446 K/uL    MPV 10.1 9.0 - 12.9 fL    Neutrophils-Polys 87.00 (H) 44.00 - 72.00 %    Lymphocytes 6.90 (L) 22.00 - 41.00 %    Monocytes 3.50 0.00 - 13.40 %    Eosinophils 0.00 0.00 - 6.90 %    Basophils 0.00 0.00 - 1.80 %    Nucleated RBC 0.00 /100 WBC    Neutrophils (Absolute) 16.10 (H) 1.82 - 7.42 K/uL    Lymphs (Absolute) 1.28 1.00 - 4.80 K/uL    Monos (Absolute) 0.65 0.00 - 0.85 K/uL    Eos (Absolute) 0.00 0.00 - 0.51 K/uL    Baso (Absolute) 0.00 0.00 - 0.12 K/uL    NRBC (Absolute) 0.00 K/uL   COMP METABOLIC PANEL   Result Value Ref Range    Sodium 128 (L) 135 - 145 mmol/L    Potassium 4.2 3.6 - 5.5 mmol/L    Chloride 85 (L) 96 - 112 mmol/L    Co2 36 (H) 20 - 33 mmol/L    Anion Gap 7.0 7.0 - 16.0    Glucose 224 (H) 65 - 99 mg/dL    Bun 15 8 - 22 mg/dL    Creatinine 0.70 0.50 - 1.40 mg/dL    Calcium 8.7 8.5 - 10.5 mg/dL    AST(SGOT) 36 12 - 45 U/L    ALT(SGPT) 31 2 - 50 U/L    Alkaline Phosphatase 99 30 - 99 U/L    Total Bilirubin 0.5 0.1 - 1.5 mg/dL    Albumin 3.2 3.2 - 4.9 g/dL    Total Protein 5.6 (L) 6.0 - 8.2 g/dL    Globulin 2.4 1.9 - 3.5 g/dL    A-G Ratio 1.3 g/dL   TROPONIN   Result Value Ref Range    Troponin T 76 (H) 6 - 19 ng/L   proBrain Natriuretic  Peptide, NT   Result Value Ref Range    NT-proBNP 918 (H) 0 - 125 pg/mL   LACTIC ACID   Result Value Ref Range    Lactic Acid 2.4 (H) 0.5 - 2.0 mmol/L   MAGNESIUM   Result Value Ref Range    Magnesium 3.3 (H) 1.5 - 2.5 mg/dL   PHOSPHORUS   Result Value Ref Range    Phosphorus 4.9 (H) 2.5 - 4.5 mg/dL   ESTIMATED GFR   Result Value Ref Range    GFR (CKD-EPI) 95 >60 mL/min/1.73 m 2   DIFFERENTIAL MANUAL   Result Value Ref Range    Myelocytes 2.60 %    Manual Diff Status PERFORMED    PERIPHERAL SMEAR REVIEW   Result Value Ref Range    Peripheral Smear Review see below    PLATELET ESTIMATE   Result Value Ref Range    Plt Estimation Normal    MORPHOLOGY   Result Value Ref Range    RBC Morphology Normal    CREATINE KINASE   Result Value Ref Range    CPK Total 88 0 - 154 U/L   PROLACTIN   Result Value Ref Range    Prolactin 55.60 (H) 2.10 - 17.70 ng/mL   EKG (NOW)   Result Value Ref Range    Report       [facility] Emergency Dept.    Test Date:  2022  Pt Name:    CHANTEL AUSTIN               Department: ER  MRN:        [id number]                      Room:       [facility]  Gender:     Male                         Technician: [id number]  :        1945                   Requested By:STEPHANIA HALL  Order #:    [id number]                    Reading MD: Stephania Hall    Measurements  Intervals                                Axis  Rate:       98                           P:          -65  VT:         149                          QRS:        137  QRSD:       99                           T:          8  QT:         309  QTc:        422    Interpretive Statements  Sinus or ectopic atrial tachycardia  Ventricular trigeminy  Low voltage, precordial leads  Right ventricular hypertrophy  No previous ECG available for comparison  Electronically Signed On 2022 0:20:05 PDT by Stephania Hall        All labs reviewed by me. Significant for leukocytosis of 18, no anemia, hyponatremia of 128 otherwise  normal electrolytes, mild hyperglycemia, creatinine normal, no transaminitis, elevated troponin 76, elevated BNP of 918, lactic acid 2.4, magnesium and phosphorus slightly elevated CPK normal, prolactin 55    RADIOLOGY  CT-CTA COMPLETE THORACOABDOMINAL AORTA   Final Result         1.  No aortic aneurysm or dissection identified.   2.  Large layering left pleural effusion   3.  Collapse of the left upper lobe without visualized bronchograms, could represent endobronchial obstruction from mucous or endobronchial lesion. Recommend bronchoscopy for further evaluation.   4.  Rightward shift of the mediastinum, likely due to large left effusion.   5.  Extensive emphysema   6.  Fat density mass in the duodenal lumen, appearance suggests lipoma.   7.  Diverticulosis   8.  Fat-containing umbilical hernia   9.  Atherosclerosis and atherosclerotic coronary artery disease      CT-HEAD W/O   Final Result         1.  No acute intracranial abnormality is identified, there are nonspecific white matter changes, commonly associated with small vessel ischemic disease.  Associated mild cerebral atrophy is noted.   2.  Atherosclerosis.         DX-CHEST-PORTABLE (1 VIEW)   Final Result         1.  Pulmonary edema and/or infiltrate.   2.  Moderate left and small right pleural effusion.   3.  Enlargement of the left superior mediastinal contour, appearance suggests thoracic aortic aneurysm. Could be further evaluated with CT angiogram of the chest for evaluation of the aorta.   4.  Cardiomegaly   5.  Nasogastric tube terminates at the gastroesophageal junction, recommend a patent      These findings were discussed with the patient's clinician, Jung Hall, on 7/21/2022 10:28 PM.      DX-CHEST-PORTABLE (1 VIEW)    (Results Pending)     The radiologist's interpretation of all radiological studies have been reviewed by me.    COURSE & MEDICAL DECISION MAKING  Nursing notes, VS, PMSFHx, labs, imaging, EKG reviewed in chart.    Heart  Score: High    MDM: 10:16 PM Aneudy Greenwood is a 77 y.o. male who presented with suspected COPD exacerbation in the field.  EMS was called.  Patient apparently was becoming obtunded and not responding to treatment although tried giving a DuoNeb.  He was unable to tolerate BiPAP due to altered mental status.  Ultimately he was intubated in the field with ketamine, fentanyl and Versed reportedly.  They reported normal vital signs and end-tidal CO2 on transport, however on arrival here he is being bagged, cyanotic and found to have no pulse, CPR started immediately.  Patient received 3 rounds of CPR, epinephrine, bicarb and magnesium.  Pulse checks showed no cardiac activity on bedside ultrasound, no pulse, PEA on the monitor.  After the third round of epinephrine and CPR, patient had a strong bounding pulse and found to have strong cardiac activity on the monitor, no sign of pneumothorax on ultrasound of the chest and no pericardial effusion on cardiac ultrasound, strong contractility noted on bedside echo. Checked endotracheal tube placement with glidscope and showed appropriate positioning.  Patient was found to be hypotensive requiring vasopressor support initially of 30 mics of normetanephrines and pushes of phenylephrine as needed and eventually improved.  A central line was placed in the right IJ by myself, see procedure note.  Chest x-ray postintubation shows a large mass in the lungs, recommending CTA aorta study.  Patient stabilized long enough to be taken to CT scanner.  Hurt and OG tube placed.  CT shows collapse of the left upper lobe concerning for endobronchial lesion.  Patient admitted to the ICU for further evaluation and treatment and bronchoscopy.  Antibiotics prophylactically started on the patient.  Labs show a leukocytosis of 18, mild lactic acidosis.  Troponin elevated likely secondary to CPR, as well as a BNP slightly elevated.  EKG without ischemic changes after return of spontaneous  circulation.  His CMP is fairly unremarkable, not anemic.  I counseled and informed family of his course of care, that the patient was critically ill, prognosis unknown at this time.  They verbalized understanding.    CRITICAL CARE: 94 minutes: Cardiac arrest, requiring multiple medications including vasopressors, bicarb, adrenaline/epinephrine, magnesium, monitoring cardiac arrhythmia, admitting to ICU, central line placement, CPR  The very real possibilty of a deterioration of this patient's condition required the highest level of my preparedness for sudden, emergent intervention.  I provided critical care services, which included medication orders, frequent reevaluations of the patient's condition and response to treatment, ordering and reviewing test results, and discussing the case with various consultants.  The critical care time associated with the care of the patient was 94 minutes. Review chart for interventions. This time is exclusive of any other billable procedures.     FINAL IMPRESSION  1. Cardiac arrest (HCC) Acute   2. Hypoxia Acute   3. Chronic obstructive pulmonary disease, unspecified COPD type (HCC) Acute   4. Lung collapse Acute   5. Hypotension, unspecified hypotension type Acute       Jc MARADIAGA), am scribing for, and in the presence of, Jung Hall.    Electronically signed by: Jc Hicks (Ora), 7/21/2022    IJung personally performed the services described in this documentation, as scribed by Jc Hicks in my presence, and it is both accurate and complete.    The note accurately reflects work and decisions made by me.  Jung Hall  7/22/2022  12:21 AM

## 2022-07-22 NOTE — PROCEDURES
VIDEO ELECTROENCEPHALOGRAM REPORT      Referring provider: Dr. Khan     DOS:  07/22/22  (total recording of 37 minutes).     INDICATION:  Aneudy Greenwood 77 y.o. male presenting with cardiac arrest.     CURRENT ANTIEPILEPTIC REGIMEN: none     TECHNIQUE: 30 channel video electroencephalogram (EEG) was performed in accordance with the international 10-20 system. The study was reviewed in bipolar and referential montages. The recording examined the patient during obtunded state.     DESCRIPTION OF THE RECORD:  The background consisted of diffuse theta range slowing at 4-5 Hz with intermixed delta slowing. spontaneous background change seen but limited reactivity noted. No well-formed posterior dominant rhythm or anterior-posterior gradient was seen. No sleep pattern seen.     ACTIVATION PROCEDURE:  hyperventilation was not performed    Intermittent Photic stimulation was performed in a stepwise fashion from 1 to 30 Hz and elicited no photic driving response.     ICTAL AND/OR INTERICTAL FINDINGS:   No focal or generalized epileptiform activity noted. No regional slowing was seen during this routine study.  No clinical events or seizures were reported or recorded during the study.     EKG: sampling of the EKG recording demonstrated sinus rhythm.     EVENTS: none     INTERPRETATION:    This is an abnormal video EEG recording in the obtunded state. The diffuse background slowing and limited reactivity are consistent with severe encephalopathy.  No epileptiform discharges or seizures were seen. This does not preclude a diagnosis of epilepsy.     Rick Ocampo MD  Diplomate in Neurology&Epilepsy  Office: [phone]  Fax: [phone]

## 2022-07-22 NOTE — CARE PLAN
Problem: Ventilation  Goal: Ability to achieve and maintain unassisted ventilation or tolerate decreased levels of ventilator support  Description: Target End Date:  4 days     Document on Vent flowsheet    1.  Support and monitor invasive and noninvasive mechanical ventilation  2.  Monitor ventilator weaning response  3.  Perform ventilator associated pneumonia prevention interventions  4.  Manage ventilation therapy by monitoring diagnostic test results  Outcome: Not Met  Note:    Ventilator Daily Summary    Vent Day # 2    Ventilator settings changed this shift: PEEP to 10, FIO2 to 60%    Weaning trials: n/a    Respiratory Procedures: bronch at bedside. No complications.

## 2022-07-22 NOTE — PROCEDURES
"Arterial Line Insertion    Date/Time: 7/22/2022 1:08 AM  Performed by: RENETTA Lester.  Authorized by: JOSE Lester   Consent: The procedure was performed in an emergent situation.  Risks and benefits: risks, benefits and alternatives were discussed  Patient identity confirmed: anonymous protocol, patient vented/unresponsive and arm band  Time out: Immediately prior to procedure a \"time out\" was called to verify the correct patient, procedure, equipment, support staff and site/side marked as required.  Preparation: Patient was prepped and draped in the usual sterile fashion.  Indications: multiple ABGs, respiratory failure and hemodynamic monitoring  Location: right radial  Anesthesia: local infiltration    Anesthesia:  Local Anesthetic: lidocaine 1% without epinephrine    Sedation:  Patient sedated: yes  Analgesia: see MAR for details  Vitals: Vital signs were monitored during sedation.    Needle gauge: 20  Seldinger technique: Seldinger technique used  Number of attempts: 1  Post-procedure: line sutured and dressing applied  Post-procedure CMS: normal  Patient tolerance: patient tolerated the procedure well with no immediate complications              "

## 2022-07-22 NOTE — PROCEDURES
Thoracentesis    Date/Time: 7/22/2022 6:07 AM  Performed by: Maranda Khan M.D.  Authorized by: Maranda Khan M.D.     Consent:     Consent obtained:  Verbal    Consent given by:  Spouse    Risks, benefits, and alternatives were discussed: yes      Risks discussed:  Bleeding, infection, pneumothorax, pain, nerve damage and incomplete drainage    Alternatives discussed:  No treatment, delayed treatment, alternative treatment and observation  Universal protocol:     Procedure explained and questions answered to patient or proxy's satisfaction: yes      Relevant documents present and verified: yes      Test results available: yes      Imaging studies available: yes      Required blood products, implants, devices, and special equipment available: yes      Site/side marked: yes      Immediately prior to procedure, a time out was called: yes      Patient identity confirmed:  Arm band and hospital-assigned identification number  Sedation:     Sedation type:  Moderate sedation  Anesthesia:     Anesthesia method:  None  Procedure details:     Preparation: Patient was prepped and draped in usual sterile fashion      Patient position:  R lateral decubitus    Location:  L midaxillary line    Intercostal space:  6th    Puncture method:  Over-the-needle catheter    Ultrasound guidance: yes      Indwelling catheter placed: no      Number of attempts:  1    Drainage characteristics:  Serosanguinous (Bright red, but translucent)  Post-procedure details:     Chest x-ray performed: yes      Chest x-ray findings:  Pleural effusion improved    Procedure completion:  Tolerated well, no immediate complications  Comments:      1.6 L removed  Sent for cytology/pathology concern for malignant effusion possibility.  LDH protein and infectious work-up sent

## 2022-07-22 NOTE — ED NOTES
Code called 2145 pt in PEA  2145: Compression started rate 110  2155: 1mg Epi given   2157: Pulse check :PEA  2158: 50 mEq Sodium bicarb given  2159: 1mg Epi given  2200: Pulse check: PEA  2202: 1 mg Epi given  2202: Pulse check: ROSC, rhythm sinus tachycardia with PVC's  2209: 2g Mag given.

## 2022-07-22 NOTE — PROCEDURES
Procedure Note    Date: 7/22/2022  Time: 0730    Procedure: Bronchoscopy with bronchoalveolar lavage and therapeutic aspiration of secretions from bronchi    Indication: Left lung atelectasis, left bloody pleural effusion, acute on chronic hypoxic and hypercapnic respiratory failure  Consent: Informed consent obtained from patient or designated decision maker after explaining the benefits/risks of the procedure including but not limited to bleeding, infection, airway trauma or loss therof, pneumothorax/hemothorax, arrythmia, or death. Patient or surrogate expressed understanding and agreement.    Time-out: Verbal consent was obtained. Immediately prior to procedure, a time out was called to verify the correct patient, procedure, equipment, support staff and site/side marked as required. Pre-procedure pain reported as 0/10 and post-procedure was 0/10.    Procedure: After obtaining consent, a time-out was performed. Respiratory therapy and nursing at bedside throughout procedure. Patient provided sedation and analgesia throughout the procedure. Placed on full ventilator support with an FiO2 of 100% throughout the procedure. Using a fiberoptic bronchoscope, trachea entered via 7.5.  0 mL of local anesthetic sprayed at the corey (2% lidocaine) achieving appropriate comfort level for patient. Airways visualized directly and the following intervention was performed: diagnostic and therapeutic lavage with all areas of lungs suctioned to clear. Findings as below. Patient tolerated procedure well without any difficulties and left in care of bedside nurse/RT.     Medications: Versed 2mg IV, Fentanyl 50mcg IV with ketamine and fentanyl infusing  Findings:   Upper airway - Not visualized as bronchoscope passed through ETT.  Trachea to corey - friable appearing mucosa without lesions or mass, ETT tip measured 3-4 cm from the corey.  Right mainstem, RUL, RML, RLL and distal airways - hyperemic and friable appearing mucosa  without mass/lesion/anatomic variance, secretions: bloody secretions noted  Left mainstem, MAYCO, lingula, LLL and distal airways - hyperemic and friable appearing mucosa seen throughout with oozing bloody mucosa noted to left upper lobe and very friable tissues.  Unable to visualize all through secondary lobe openings to MAYCO due to compressive etiology.  See pictures below.  Suspect ominous lesion.  All lobes were suctioned to clear of bloody secretions        Samples - RUL and MAYCO of bronchi sent to lab for micro analysis and cytology        Left Upper Lobe    Complications: pt had episodes of hypoxia that he recovered quickly form  CXR (if applicable): n/a    Reva Barbosa MD  Pulmonary and Critical Care Medicine

## 2022-07-22 NOTE — PROGRESS NOTES
Critical Care Progress Note    Date of admission  7/21/2022    Chief Complaint  77 y.o. male admitted 7/21/2022 with acute on chronic hypercapnic and hypoxic respiratory failure    Hospital Course  Mr. Greenwood is a 77 year old male with the past medical history of COPD on home O2 who had reportedly complained of worsening shortness of breath for the last 1-2 weeks that acutely worsened in the last 2 days prior to admission. EMS was activated and found the patient lethargic with low O2 sats.  He was placed on BiPAP without improvement of O2 sats and intubated in the field. Upon arrival to the ER, the patient had a PEA cardiac arrest requiring 2 rounds of CPR/epi.  He was found to have a large left sided pleural effusion with compressive atelectasis.  He was admitted to the ICU.  He did not need hypothermia protocols as he was following commands.      Interval Problem Update  Reviewed last 24 hour events:   - s/p left thoracentesis with removal of 1.6 liters blood fluid   - improvement to hemodynamics and vent mechanics after thoracentesis   - s/p bronchoscopy with BAL to MAYCO and RUL   - sedated after bronc, w/d    - Ketamine at 0.3, fent at 50   - SR 80s   - levo at 4   - vaso 0.03   -    - Tmax 38.3   - OG clamped, NPO   - UOP of 700cc overnight with mcdonald   - vent day32   - PEEP 14, FiO2 100%   - CXR(reviewed): appears to have a large MAYCO opacity vs mass   - pepcid, lovenox   - day 1 of azithromycin/ceftriaxone   - solumedrol/lasix   - Na 128   - K 4.8   - lactate 2.5   - Mg 2.5   - WBCs 21    Review of Systems  Review of Systems   Unable to perform ROS: Intubated        Vital Signs for last 24 hours   Temp:  [32.5 °C (90.5 °F)-35.5 °C (95.9 °F)] 35.5 °C (95.9 °F)  Pulse:  [0-102] 82  Resp:  [9-80] 20  BP: ()/(39-89) 87/54  SpO2:  [77 %-100 %] 100 %    Hemodynamic parameters for last 24 hours  CVP:  [14 MM HG-289 MM HG] 18 MM HG    Respiratory Information for the last 24 hours  Vent Mode: APVCMV  Rate  (breaths/min): 20  Vt Target (mL): 400  PEEP/CPAP: 14  MAP: 17  Control VTE (exp VT): 340    Physical Exam   Physical Exam  Vitals and nursing note reviewed.   Constitutional:       General: He is not in acute distress.     Appearance: He is ill-appearing. He is not toxic-appearing.   HENT:      Head: Normocephalic and atraumatic.      Right Ear: External ear normal.      Left Ear: External ear normal.      Nose: Nose normal. No rhinorrhea.      Mouth/Throat:      Mouth: Mucous membranes are moist.      Comments: ETT in place  Eyes:      General: No scleral icterus.     Conjunctiva/sclera: Conjunctivae normal.      Pupils: Pupils are equal, round, and reactive to light.   Neck:      Comments: Right IJ TLC  Cardiovascular:      Rate and Rhythm: Normal rate and regular rhythm.      Pulses:           Radial pulses are 1+ on the right side and 1+ on the left side.        Dorsalis pedis pulses are 1+ on the right side and 1+ on the left side.      Heart sounds: Heart sounds are distant. No murmur heard.  Pulmonary:      Breath sounds: No wheezing.      Comments: Breathing comfortably on the vent, diminished left >>right  Chest:      Chest wall: No tenderness.   Abdominal:      Palpations: Abdomen is soft.      Tenderness: There is no abdominal tenderness. There is no guarding or rebound.   Musculoskeletal:         General: Normal range of motion.      Cervical back: Normal range of motion and neck supple.      Right lower le+ Pitting Edema present.      Left lower le+ Pitting Edema present.   Lymphadenopathy:      Cervical: No cervical adenopathy.   Skin:     General: Skin is dry.      Capillary Refill: Capillary refill takes more than 3 seconds.      Findings: No rash.      Comments: Cool/cyanotic toes   Neurological:      Cranial Nerves: No cranial nerve deficit.      Sensory: No sensory deficit.      Motor: No weakness.   Psychiatric:      Comments: Unable to assess         Medications  Current  Facility-Administered Medications   Medication Dose Route Frequency Provider Last Rate Last Admin   • ipratropium-albuterol (DUONEB) nebulizer solution  3 mL Nebulization Q4HRS (RT) Alana Wong M.D.       • ipratropium-albuterol (DUONEB) nebulizer solution  3 mL Nebulization Q2HRS PRN (RT) Alana Wong M.D.   3 mL at 07/22/22 0227   • methylPREDNISolone (SOLU-MEDROL) 40 MG injection 40 mg  40 mg Intravenous DAILY Alana Wong M.D.   40 mg at 07/22/22 0542   • meperidine (DEMEROL) injection 25 mg  25 mg Intravenous Q2HRS PRN Maranda Khan M.D.       • fentanyl 50 mcg/mL infusion   mcg/hr Intravenous Continuous Maranda Khan M.D. 1 mL/hr at 07/22/22 0130 50 mcg/hr at 07/22/22 0130   • cefTRIAXone (Rocephin) injection 1,000 mg  1,000 mg Intramuscular DAILY Alana Wong M.D.       • vasopressin (VASOSTRICT) 20 Units in  mL Infusion  0.03 Units/min Intravenous Continuous Maranda Khan M.D. 9 mL/hr at 07/22/22 0227 0.03 Units/min at 07/22/22 0227   • azithromycin (ZITHROMAX) 500 mg in D5W 250 mL IVPB premix  500 mg Intravenous Q24HRS Maranda Khan M.D.   Stopped at 07/22/22 0700   • ketamine (Ketalar) 500 mg/250 mL infusion  0-2.5 mg/kg/hr (Adjusted) Intravenous Continuous Maranda Khan M.D. 11.5 mL/hr at 07/22/22 0340 0.3 mg/kg/hr at 07/22/22 0340   • EPINEPHrine (Adrenalin) infusion 4 mg/250 mL (premix)  0-10 mcg/min Intravenous Continuous Maranda Khan M.D. 15 mL/hr at 07/22/22 0620 4 mcg/min at 07/22/22 0620   • furosemide (LASIX) injection 20 mg  20 mg Intravenous BID DIURETIC Maranda Khan M.D.   20 mg at 07/22/22 0558   • enoxaparin (Lovenox) inj 40 mg  40 mg Subcutaneous DAILY AT 1800 Reva Barbosa M.D.       • norepinephrine (Levophed) 8 mg in 250 mL NS infusion (premix)  0-40 mcg/min Intravenous Continuous Maranda Khan M.D.   Stopped at 07/22/22 0620   • Respiratory Therapy Consult   Nebulization Continuous RT Maranda Khan M.D.       •  famotidine (PEPCID) tablet 20 mg  20 mg Enteral Tube Q12HRS Maranda Khan M.D.        Or   • famotidine (PEPCID) injection 20 mg  20 mg Intravenous Q12HRS Maranda Khan M.D.   20 mg at 07/22/22 0541   • senna-docusate (PERICOLACE or SENOKOT S) 8.6-50 MG per tablet 2 Tablet  2 Tablet Enteral Tube BID Maranda Khan M.D.   2 Tablet at 07/22/22 0541    And   • polyethylene glycol/lytes (MIRALAX) PACKET 1 Packet  1 Packet Enteral Tube QDAY PRN Maranda Khan M.D.        And   • magnesium hydroxide (MILK OF MAGNESIA) suspension 30 mL  30 mL Enteral Tube QDAY PRN Maranda Khan M.D.        And   • bisacodyl (DULCOLAX) suppository 10 mg  10 mg Rectal QDAY PRN Maranda Khan M.D.       • MD Alert...ICU Electrolyte Replacement per Pharmacy   Other PHARMACY TO DOSE Maranda Khan M.D.       • lidocaine (XYLOCAINE) 1 % injection 2 mL  2 mL Tracheal Tube Q30 MIN PRN Maranda Khan M.D.       • Pharmacy Consult: pharmacy to discontinue all other orders for acetaminophen   Other PHARMACY TO DOSE Maranda Khan M.D.           Fluids    Intake/Output Summary (Last 24 hours) at 7/22/2022 0636  Last data filed at 7/22/2022 0600  Gross per 24 hour   Intake 1000 ml   Output 600 ml   Net 400 ml       Laboratory  Recent Labs     07/22/22  0141 07/22/22  0221 07/22/22  0225 07/22/22  0527   ISTATAPH 7.242*  --  7.215* 7.334*   ISTATAPCO2 81.1*  --  86.6* 61.8*   ISTATAPO2 50*  --  56* 73   ISTATATCO2 37*  --  38* 35*   RGDJOXS1LUI 76*  --  80* 93   ISTATARTHCO3 34.9*  --  35.1* 32.9*   ISTATARTBE 4*  --  5* 5*   ISTATTEMP 35.8 C 36.0 C 36.0 C 37.8 C   ISTATFIO2 100 100 100 90   ISTATSPEC Arterial Venous Arterial Arterial   ISTATAPHTC 7.258*  --  7.229* 7.322*   JCCPEQCX4MN 46*  --  52* 77     Recent Labs     07/21/22 2228 07/22/22  0245   CPKTOTAL 88 124     Recent Labs     07/21/22 2228 07/22/22  0245   SODIUM 128* 128*   POTASSIUM 4.2 4.8   CHLORIDE 85* 89*   CO2 36* 29   BUN 15 17   CREATININE 0.70 0.57    MAGNESIUM 3.3* 2.5   PHOSPHORUS 4.9* 4.0   CALCIUM 8.7 8.2*     Recent Labs     07/21/22 2228 07/22/22  0245   ALTSGPT 31  --    ASTSGOT 36  --    ALKPHOSPHAT 99  --    TBILIRUBIN 0.5  --    GLUCOSE 224* 168*     Recent Labs     07/21/22 2228 07/22/22  0245   WBC 18.5* 21.2*   NEUTSPOLYS 87.00* 90.40*   LYMPHOCYTES 6.90* 0.90*   MONOCYTES 3.50 8.70   EOSINOPHILS 0.00 0.00   BASOPHILS 0.00 0.00   ASTSGOT 36  --    ALTSGPT 31  --    ALKPHOSPHAT 99  --    TBILIRUBIN 0.5  --      Recent Labs     07/21/22 2228 07/22/22  0210 07/22/22  0245   RBC 4.58*  --  4.75   HEMOGLOBIN 14.5  --  14.8   HEMATOCRIT 44.3  --  44.8   PLATELETCT 248  --  268   PROTHROMBTM  --  13.9  --    INR  --  1.08  --        Imaging  ECHO:  CONCLUSIONS  Stat echo ordered for shock. Markedly enlarged RA/RV with reduced RV   systolic function. LV shwoing hyperdynamic function. LVEF 70% with mild   concentric LVH. There is cavitary obliteration with flattened   interventricular septum looks to be intefering with LV filling. Dilated   IVC consistent with volume overload. Overall picture consistent with   cor pulmonale. No prior study is available for comparison.     CTA thoracoabdominal:  1.  No aortic aneurysm or dissection identified.  2.  Large layering left pleural effusion  3.  Collapse of the left upper lobe without visualized bronchograms, could represent endobronchial obstruction from mucous or endobronchial lesion. Recommend bronchoscopy for further evaluation.  4.  Rightward shift of the mediastinum, likely due to large left effusion.  5.  Extensive emphysema  6.  Fat density mass in the duodenal lumen, appearance suggests lipoma.  7.  Diverticulosis  8.  Fat-containing umbilical hernia  9.  Atherosclerosis and atherosclerotic coronary artery disease    Assessment/Plan  * Cardiac arrest (HCC)- (present on admission)  Assessment & Plan  PEA cardiac arrest likely due to hypoxia  S/p ACLS/CPR/epi  No need for hypothermia  protocols  Supportive care    Hyponatremia- (present on admission)  Assessment & Plan  ?SIADH related to lung disease  Following     Centrilobular emphysema (HCC)- (present on admission)  Assessment & Plan  Noted on CT chest today  Steroids/Bronchodilators  RT/O2 protocols  On home O2, no documentation of pulmonary visits or PFTs      Atelectasis of left lung- (present on admission)  Assessment & Plan  Compressive, ?obstructive-->?underlying mass  S/p bronchoscopy with BAL on 7/22 with suspicious mucosa to MAYCO  Cont ceftriaxone/azithromycin  Follow cultures and cytology from BAL  Will need repeat bronch and brushings/biopsies    Pleural effusion on left- (present on admission)  Assessment & Plan  S/p thoracentesis on 7/22 with 1.6 liters removed, bloody fluid  Concerning for underlying malignancy  Follow cultures, cytology      Acute on chronic respiratory failure with hypoxia and hypercapnia (HCC)- (present on admission)  Assessment & Plan  Intubated on 7/21 in the field  Cont full vent support  RT/O2 protocols  Vent bundle protocols  Lung protective ventilator strategies  Steroids/Bronchodilators  Bronchoscopy with BAL/cytology today, cont Ceftriaxone/azithromycin  SAT/SBTs       VTE:  Lovenox  Ulcer: H2 Antagonist  Lines: Central Line  Ongoing indication addressed, Arterial Line  Ongoing indication addressed and Hurt Catheter  Ongoing indication addressed    I have performed a physical exam and reviewed and updated ROS and Plan today (7/22/2022). In review of yesterday's note (7/21/2022), there are no changes except as documented above.     Discussed patient condition and risk of morbidity and/or mortality with Family, RN, RT, Pharmacy, Charge nurse / hot rounds and Patient    The patient remains critically ill.  I have assessed and reassessed the respiratory status and made ventilator adjustments based upon arterial blood gas analysis, ventilator waveforms and airway mechanics.  I have assessed and reassessed  the blood pressure, hemodynamics, cardiovascular status. This patient remains at high risk for worsening cardiopulmonary dysfunction and death without the above critical care interventions.    Critical care time = 90 minutes in directly providing and coordinating critical care and extensive data review.  No time overlap and excludes procedures.

## 2022-07-23 PROBLEM — E87.6 HYPOKALEMIA: Status: ACTIVE | Noted: 2022-01-01

## 2022-07-23 NOTE — PROGRESS NOTES
Monitor Summary.  SR-ST 70s-100s    Media Information        Deterioration Index Score > 60     critical  Deterioration Index Score > 60  Deterioration Index                 File Link    Monitoring Strips GE - Scan on 7/22/2022 12:00 AM        Key Information    Document ID File Type Document Type Description   [id number] Image Monitoring Strips GE        Import Information    Attached At Date Time User Dept   Encounter Level 7/22/2022  9:17 AM Physician Outpatient        Encounter    Hospital Encounter on 7/21/22             Page 2 of 2            Document Information    Cardiology Imaging:  Monitoring Strips GE      07/22/2022 00:00   Attached To:   Hospital Encounter on 7/21/22     Source Information    Physician Outpatient

## 2022-07-23 NOTE — PROGRESS NOTES
[facility] Progress Note      Admit Date: 7/21/2022    Resident(s): Beth Mcnair M.D.   Attending:  TRISTA KAPLAN/ Dr. Barbosa     Patient ID:    Name:  Aneudy Greenwood   YOB: 1945  Age:  77 y.o.  male   MRN:  [id number]    Date of Admission:   7/21/2022     Chief Complaint:  77 y.o. male admitted 7/21/2022 with acute on chronic hypercapnic and hypoxic respiratory failure    Hospital Course (carried forward and updated):  Mr. Greenwood is a 77 year old male with the past medical history of COPD on home O2 who had reportedly complained of worsening shortness of breath for the last 1-2 weeks that acutely worsened in the last 2 days prior to admission. EMS was activated and found the patient lethargic with low O2 sats.  He was placed on BiPAP without improvement of O2 sats and intubated in the field. Upon arrival to the ER, the patient had a PEA cardiac arrest requiring 2 rounds of CPR/epi.  He was found to have a large left sided pleural effusion with compressive atelectasis.  He was admitted to the ICU.  He did not need hypothermia protocols as he was following commands.  7/22:  VD#2, s/p left thora with 1.6 liters out, s/p bronch with BAL, started Lasix diuresis, day 1 of abx             Lasix switched to acetazolamide overnight     Interval Events:  Off of sedation since yesterday, weaned off of pressors as well. Following all commands   Dimox has been started   SR in 90s  SBP   Tmax 99.9   Bowel movement prior to admission   OG tube clamped   Cont NPO   UOP of 1500cc overnight, Hurt  Right IJ TLC  level 2 mobility-->level 3  VD #3  CXR(reviewed): large MAYCO opacity, increased interstitial markings noted  SBT 5/10 for last 2 hours  Lovenox/pepcid  Day 2 of C3/azithromycin  Diamox 250mg BID  Phos 2.2  K 3.7    Vitals Range last 24h:  Temp:  [37.4 °C (99.3 °F)-38.2 °C (100.8 °F)] 37.4 °C (99.3 °F)  Pulse:  [] 93  Resp:  [7-30] 16  BP: ()/(50-66) 108/59  SpO2:  [92 %-98 %] 94  %      Intake/Output Summary (Last 24 hours) at 7/23/2022 1109  Last data filed at 7/23/2022 0800  Gross per 24 hour   Intake 300.29 ml   Output 1925 ml   Net -1624.71 ml        Review of Systems   Unable to perform ROS: Intubated   Complete review of systems not possible as patient was intubated, however patient alert and answers simple questions with a thumbs up/down and writing on a paper.   Denies any chest pain, fevers/chills. Wrote he is getting breathing treatments and that's why he continues to be on the ventilator machine.     PHYSICAL EXAM:  Vitals:    07/23/22 0400 07/23/22 0500 07/23/22 0600 07/23/22 0700   BP: 101/58 108/55 109/56 108/59   Pulse: (!) 103 99 98 93   Resp: 17 16 18 16   Temp: 37.4 °C (99.3 °F)  37.4 °C (99.3 °F)    TempSrc: Bladder  Bladder    SpO2: 94% 94% 93% 94%   Weight:   83.7 kg (184 lb 8.4 oz)    Height:        Body mass index is 27.25 kg/m².    O2 therapy: Pulse Oximetry: 94 %, O2 Delivery Device: Ventilator    Date 07/23/22 0700 - 07/24/22 0659   Shift 8985-1098 4051-8254 0021-3017 24 Hour Total   INTAKE   P.O. 0   0     P.O. 0   0   Shift Total 0   0   OUTPUT   Shift Total       NET 0   0       Physical Exam      Physical Exam  Vitals and nursing note reviewed.   Constitutional:       General: He is not in acute distress.  HENT:      Head: Normocephalic and atraumatic.      Right Ear: External ear normal.      Left Ear: External ear normal.      Nose: Nose normal. No congestion.      Mouth/Throat:      Comments: ETT in place  Eyes:      Extraocular Movements: Extraocular movements intact.      Pupils: Pupils are equal, round, and reactive to light.   Neck:      Comments: Right IJ TLC   Cardiovascular:      Rate and Rhythm: Normal rate and regular rhythm.      Comments: Heart sounds: Heart sounds are distant. No murmur heard    Radial pulses are 1+ on the right side and 1+ on the left side  Dorsalis pedis pulses are 1+ on the right side and 1+ on the left side     Pulmonary:       Comments: Breathing comfortably on the vent, diminished left>>right   Abdominal:      General: There is no distension.      Palpations: Abdomen is soft.      Tenderness: There is no abdominal tenderness. There is no guarding.   Musculoskeletal:      Right lower leg: Edema present.      Left lower leg: Edema present.   Lymphadenopathy:      Cervical: No cervical adenopathy.   Skin:     General: Skin is dry.      Capillary Refill: Capillary refill takes more than 3 seconds.      Comments: Toes are cold    Neurological:      Mental Status: He is alert.   Psychiatric:      Comments: Unable to assess          Recent Labs     07/22/22  0527 07/23/22  0000 07/23/22  0435   ISTATAPH 7.334* 7.537* 7.519*   ISTATAPCO2 61.8* 46.3* 50.0*   ISTATAPO2 73 61* 58*   ISTATATCO2 35* 41* 42*   [id number] 93 93 92*   ISTATARTHCO3 32.9* 39.3* 40.7*   ISTATARTBE 5* 15* 16*   ISTATTEMP 37.8 C 37.7 C 37.4 C   ISTATFIO2 90 60 60   ISTATSPEC Arterial Arterial Arterial   ISTATAPHTC 7.322* 7.526* 7.513*   [id number] 77 64 59*     Recent Labs     07/21/22 2228 07/22/22 0245 07/23/22  0434   SODIUM 128* 128* 132*   POTASSIUM 4.2 4.8 3.7   CHLORIDE 85* 89* 90*   CO2 36* 29 33   BUN 15 17 17   CREATININE 0.70 0.57 0.44*   MAGNESIUM 3.3* 2.5 2.0   PHOSPHORUS 4.9* 4.0 2.2*   CALCIUM 8.7 8.2* 8.4*     Recent Labs     07/21/22 2228 07/22/22  0245 07/23/22  0434   ALTSGPT 31  --   --    ASTSGOT 36  --   --    ALKPHOSPHAT 99  --   --    TBILIRUBIN 0.5  --   --    GLUCOSE 224* 168* 112*     Recent Labs     07/21/22 2228 07/22/22  0210 07/22/22  0245 07/23/22  0434   RBC 4.58*  --  4.75 4.05*   HEMOGLOBIN 14.5  --  14.8 12.6*   HEMATOCRIT 44.3  --  44.8 38.5*   PLATELETCT 248  --  268 175   PROTHROMBTM  --  13.9  --   --    INR  --  1.08  --   --      Recent Labs     07/21/22  2228 07/22/22  0245 07/23/22  0434   WBC 18.5* 21.2* 11.8*   NEUTSPOLYS 87.00* 90.40* 84.50*   LYMPHOCYTES 6.90* 0.90* 4.60*   MONOCYTES 3.50 8.70 10.30   EOSINOPHILS 0.00  0.00 0.00   BASOPHILS 0.00 0.00 0.00   ASTSGOT 36  --   --    ALTSGPT 31  --   --    ALKPHOSPHAT 99  --   --    TBILIRUBIN 0.5  --   --        Meds:  • acetaZOLAMIDE (DIAMOX) IV  250 mg     • potassium phosphate IVPB (CUSTOM)  15 mmol 15 mmol (07/23/22 0918)   • [START ON 7/24/2022] azithromycin  500 mg     • Pharmacy  1 Each     • ipratropium-albuterol  3 mL     • ipratropium-albuterol  3 mL     • methylPREDNISolone  40 mg     • enoxaparin (LOVENOX) injection  40 mg     • cefTRIAXone (ROCEPHIN) IV  1 g     • acetaminophen  650 mg     • Respiratory Therapy Consult       • famotidine  20 mg      Or   • famotidine  20 mg     • senna-docusate  2 Tablet      And   • polyethylene glycol/lytes  1 Packet      And   • magnesium hydroxide  30 mL      And   • bisacodyl  10 mg     • MD Alert...Adult ICU Electrolyte Replacement per Pharmacy       • lidocaine  2 mL          Imaging:  DX-CHEST-PORTABLE (1 VIEW)   Final Result         1.  Pulmonary edema and/or infiltrate, stable since prior study.   2.  Small bilateral pleural effusion.   3.  Enlargement of the left superior mediastinal contour, compatible with upper lobe collapse on recent CT.   4.  Cardiomegaly      US-EXTREMITY VENOUS LOWER BILAT   Final Result      DX-CHEST-PORTABLE (1 VIEW)   Final Result         1.  Pulmonary edema and/or infiltrate, stable since prior study.   2.  Small bilateral pleural effusion.   3.  Enlargement of the left superior mediastinal contour, compatible with upper lobe collapse on recent CT.   4.  Cardiomegaly      EC-ECHOCARDIOGRAM COMPLETE W/O CONT   Final Result      DX-CHEST-PORTABLE (1 VIEW)   Final Result         1.  Pulmonary edema and/or infiltrate.   2.  Moderate left and small right pleural effusion.   3.  Enlargement of the left superior mediastinal contour, compatible with upper lobe collapse on recent CT.   4.  Cardiomegaly      CT-CTA COMPLETE THORACOABDOMINAL AORTA   Final Result         1.  No aortic aneurysm or dissection  identified.   2.  Large layering left pleural effusion   3.  Collapse of the left upper lobe without visualized bronchograms, could represent endobronchial obstruction from mucous or endobronchial lesion. Recommend bronchoscopy for further evaluation.   4.  Rightward shift of the mediastinum, likely due to large left effusion.   5.  Extensive emphysema   6.  Fat density mass in the duodenal lumen, appearance suggests lipoma.   7.  Diverticulosis   8.  Fat-containing umbilical hernia   9.  Atherosclerosis and atherosclerotic coronary artery disease      CT-HEAD W/O   Final Result         1.  No acute intracranial abnormality is identified, there are nonspecific white matter changes, commonly associated with small vessel ischemic disease.  Associated mild cerebral atrophy is noted.   2.  Atherosclerosis.         DX-CHEST-PORTABLE (1 VIEW)   Final Result         1.  Pulmonary edema and/or infiltrate.   2.  Moderate left and small right pleural effusion.   3.  Enlargement of the left superior mediastinal contour, appearance suggests thoracic aortic aneurysm. Could be further evaluated with CT angiogram of the chest for evaluation of the aorta.   4.  Cardiomegaly   5.  Nasogastric tube terminates at the gastroesophageal junction, recommend a patent      These findings were discussed with the patient's clinician, Jung Hall, on 7/21/2022 10:28 PM.          ASSESSEMENT and PLAN:    * Cardiac arrest (HCC)- (present on admission)  Assessment & Plan  PEA cardiac arrest likely due to hypoxia  S/p ACLS/CPR/epi  No need for hypothermia protocols  Supportive care    Hypokalemia- (present on admission)  Assessment & Plan  Replete to goal > 4    Hyponatremia- (present on admission)  Assessment & Plan  ?SIADH related to lung disease  Following   Improving after Lasix    Centrilobular emphysema (HCC)- (present on admission)  Assessment & Plan  Noted on CT chest   Steroids/Bronchodilators  RT/O2 protocols  On home O2, no  documentation of pulmonary visits or PFTs      Atelectasis of left lung- (present on admission)  Assessment & Plan  Compressive, ?obstructive-->?underlying mass  S/p bronchoscopy with BAL on 7/22 with suspicious mucosa to MAYCO  Cont ceftriaxone/azithromycin  Follow cultures and cytology from BAL  Will need repeat bronch and brushings/biopsies.    Pleural effusion on left- (present on admission)  Assessment & Plan  S/p thoracentesis on 7/22 with 1.6 liters removed, bloody fluid  Concerning for underlying malignancy  Follow cultures, cytology      Acute on chronic respiratory failure with hypoxia and hypercapnia (HCC)- (present on admission)  Assessment & Plan  Intubated on 7/21 in the field  Cont full vent support  RT/O2 protocols  Vent bundle protocols  Lung protective ventilator strategies  Steroids/Bronchodilators  Bronchoscopy with BAL/cytology on 7/22, cont Ceftriaxone/azithromycin  SAT/SBTs  Diuresis with Lasix-->change to Diamox today      DISPO: Pending extubation     CODE STATUS: Full Code     Quality Measures:  Feeding: Enteral tube   Analgesia: Tylenol   Thromboprophylaxis: Lovenox  Head of bed: >30 degrees  Ulcer prophylaxis: H2 antagonist (famotidine)  Glycemic control: N/A   Indwelling lines: R IJ TLC, Radial   Deescalation of antibiotics: N/A       Beth Mcnair M.D.

## 2022-07-23 NOTE — DIETARY
"Nutrition Support Assessment:  Day 2 of admit.  Aneudy Greenwood is a 77 y.o. male with admitting DX of cardiac arrest     Current problem list:  1. Hypokalemia  2. Acute on chronic respiratory failure with hypoxia and hypercapnia  3. Pleural effusion of left  4. Centrilobular emphysema  5. Hyponatremia  6. Cardiac Arrest     Assessment:  Estimated Nutritional Needs based on:   Height: 175.3 cm (5' 9\")  Weight: 83.7 kg (184 lb 8.4 oz)- bed scale  Weight to Use in Calculations: 83.7 kg (184 lb 8.4 oz)  Ideal Body Weight: 72.7 kg, current weight is 115% of IBW  Body mass index is 27.25 kg/m²., BMI classification: Overweight     Calculation/Equation: [place] x 1.2= 1865 kcals  PSU (Tmax= 38.1, vent L/m= 6.90)= 1855 kcals  Total Calories / day: 1758 - 2176  (Calories / k - 26) ABW  Total Grams Protein / day: 100 - 126  (Grams Protein / k.2 - 1.5) ABW     Evaluation:   1. New consult for tube feeding, pt is currently intubated  2. Per RN, pt is pending a Cortrak placement today  3. Pt MD note, pt is off sedation, following commands, writing on paper  4. Pt is -27l ml fluids since admit per I/O's  5. Labs: sodium 132, glucose 112, creatine 0.44, phosphorus 2.2  6. Meds: Diamox, Zithromax, Rocephin, Pepcid, methylprednisolone, senna-docusate, potassium phosphate, bowel regimen   7. Skin: No staged wounds noted  8. GI: last BM PTA  9. A peptide-based formula that is carb controlled indicated at this time to meet pt's estimated nutrition needs as pt is currently intubated.     Malnutrition Risk: Unable to fully assess at this time.      Recommendations/Plan:  1. Tube feeding Impact Peptide 1.5 @ goal rate of 50 ml/hr which provides 1800 kcals, 113g protein, 924 ml free water and 168g carbs.   2. Fluids per MD  3. Monitor labs and weights    RD following.               "

## 2022-07-23 NOTE — CARE PLAN
Problem: Ventilation  Goal: Ability to achieve and maintain unassisted ventilation or tolerate decreased levels of ventilator support  Description: Target End Date:  4 days     Document on Vent flowsheet    1.  Support and monitor invasive and noninvasive mechanical ventilation  2.  Monitor ventilator weaning response  3.  Perform ventilator associated pneumonia prevention interventions  4.  Manage ventilation therapy by monitoring diagnostic test results  Outcome: Not Met        Ventilator Daily Summary    Vent Day #3    Ventilator settings changed this shift: ASV 80% per Dr Khan, peep 10, 65%    Weaning trials: None    Respiratory Procedures: none    Plan: Continue current ventilator settings and wean mechanical ventilation as tolerated per physician orders.

## 2022-07-23 NOTE — CARE PLAN
Problem: Safety - Medical Restraint  Goal: Free from restraint(s) (Restraint for Interference with Medical Device)  Outcome: Met     The patient is Watcher - Medium risk of patient condition declining or worsening    Shift Goals  Clinical Goals: stable BP, improve pulm fx, thorac, bronch maybe  Patient Goals: MAEVE  Family Goals: MAEVE    Progress made toward(s) clinical / shift goals:  Restraints removed, weaning ventilator settings, weaning pressor support    Patient is not progressing towards the following goals: Not eligible to progress mobility at this time

## 2022-07-23 NOTE — PROGRESS NOTES
Patient seen at bedside much improved oxygenation and ventilation from last night.  Patient fully awake communicating mental status good without delirium.     Patient does not have ARDS, and with improving respiratory status, transition to ASV for increased patient comfort on ventilator and hopefully decreased sedation required.     ABG on ASV 80% with alkalosis,  notably bicarb had increased from 35->42 .  Patient with significant diuresis from Lasix, and 1.5 L off from thoracentesis yesterday.  Likely some contribution of metabolic alkalosis.  Inpatient with chronic CO2 retention, concomitant metabolic alkalosis (when patient is able to be extubated) can lead to decreased respiratory drive.  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      -Acetazolamide x1 ( am bmp bicarb returned at 33, so deferred this at this time)    -Stop Lasix  -ASV 80%, okay for higher volumes as sometimes required for patients with COPD, more comfortable for patient and no vent dyssynchrony, hopefully can decrease amount of sedation needed  -Follow-up on a.m. BMP, repeat BMP, ABG for noon ordered, to evaluate response to acetazolamide/  -Mild increase in oxygen requirements, possible increase in effusion, not significant changes.  If patient continues to have increasing pleural effusion especially if reaccumulate quickly and found to be due to malignant effusion, possible Pleurx catheter may be best choice.  We will defer this decision and discussion to primary team.     Total critical care time: 40 minutes

## 2022-07-23 NOTE — CARE PLAN
The patient is Watcher - Medium risk of patient condition declining or worsening    Shift Goals  Clinical Goals: MAP >65, hemodynamic stability, RASS -1 to +1  Patient Goals: MAEVE  Family Goals: MAEVE    Progress made toward(s) clinical / shift goals:        Problem: Skin Integrity  Goal: Skin integrity is maintained or improved  Outcome: Progressing     Problem: Hemodynamics  Goal: Patient's hemodynamics, fluid balance and neurologic status will be stable or improve  Outcome: Progressing     Problem: Mechanical Ventilation  Goal: Safe management of artificial airway and ventilation  Outcome: Progressing  Goal: Patient will be able to express needs and understand communication  Outcome: Progressing       Patient is not progressing towards the following goals:

## 2022-07-23 NOTE — CARE PLAN
The patient is Stable - Low risk of patient condition declining or worsening    Shift Goals  Clinical Goals: continue to wean vent.  Patient Goals: rest and recover  Family Goals: yessenia    Progress made toward(s) clinical / shift goals:  yes       Problem: Knowledge Deficit - Standard  Goal: Patient and family/care givers will demonstrate understanding of plan of care, disease process/condition, diagnostic tests and medications  Outcome: Progressing     Problem: Knowledge Deficit - COPD  Goal: Patient/significant other demonstrates understanding of disease process, utilization of the Action Plan, medications and discharge instruction  Outcome: Progressing     Problem: Risk for Infection - COPD  Goal: Patient will remain free from signs and symptoms of infection  Outcome: Progressing     Problem: Nutrition - Advanced  Goal: Patient will display progressive weight gain toward goal have adequate food and fluid intake  Outcome: Progressing     Problem: Ineffective Airway Clearance  Goal: Patient will maintain patent airway with clear/clearing breath sounds  Outcome: Progressing     Problem: Impaired Gas Exchange  Goal: Patient will demonstrate improved ventilation and adequate oxygenation and participate in treatment regimen within the level of ability/situation.  Outcome: Progressing     Problem: Risk for Aspiration  Goal: Patient's risk for aspiration will be absent or decrease  Outcome: Progressing     Problem: Self Care  Goal: Patient will have the ability to perform ADLs independently or with assistance (bathe, groom, dress, toilet and feed)  Outcome: Progressing     Problem: Safety - Medical Restraint  Goal: Remains free of injury from restraints (Restraint for Interference with Medical Device)  Outcome: Progressing     Problem: Skin Integrity  Goal: Skin integrity is maintained or improved  Outcome: Progressing     Problem: Fall Risk  Goal: Patient will remain free from falls  Outcome: Progressing     Problem:  Hemodynamics  Goal: Patient's hemodynamics, fluid balance and neurologic status will be stable or improve  Outcome: Progressing     Problem: Mechanical Ventilation  Goal: Safe management of artificial airway and ventilation  Outcome: Progressing  Goal: Patient will be able to express needs and understand communication  Outcome: Progressing     Problem: Mechanical Ventilation  Goal: Patient will be able to express needs and understand communication  Outcome: Progressing     Problem: Mechanical Ventilation  Goal: Safe management of artificial airway and ventilation  Outcome: Progressing     Problem: Hemodynamics  Goal: Patient's hemodynamics, fluid balance and neurologic status will be stable or improve  Outcome: Progressing     Problem: Fall Risk  Goal: Patient will remain free from falls  Outcome: Progressing     Problem: Skin Integrity  Goal: Skin integrity is maintained or improved  Outcome: Progressing     Problem: Safety - Medical Restraint  Goal: Remains free of injury from restraints (Restraint for Interference with Medical Device)  Outcome: Progressing     Problem: Mechanical Ventilation  Goal: Patient will be able to express needs and understand communication  Outcome: Progressing

## 2022-07-23 NOTE — PROGRESS NOTES
Critical Care Progress Note    Date of admission  7/21/2022    Chief Complaint  77 y.o. male admitted 7/21/2022 with acute on chronic hypercapnic and hypoxic respiratory failure    Hospital Course  Mr. Greenwood is a 77 year old male with the past medical history of COPD on home O2 who had reportedly complained of worsening shortness of breath for the last 1-2 weeks that acutely worsened in the last 2 days prior to admission. EMS was activated and found the patient lethargic with low O2 sats.  He was placed on BiPAP without improvement of O2 sats and intubated in the field. Upon arrival to the ER, the patient had a PEA cardiac arrest requiring 2 rounds of CPR/epi.  He was found to have a large left sided pleural effusion with compressive atelectasis.  He was admitted to the ICU.  He did not need hypothermia protocols as he was following commands.  7/22:  VD#2, s/p left thora with 1.6 liters out, s/p bronch with BAL, started Lasix diuresis, day 1 of abx    Interval Problem Update  Reviewed last 24 hour events:   - no events overnight   - off sedation, following all commands, writing notes   - SR 90s   - SBP 90-100s   - off pressors   - Tmax 99.9   - BM pta   - OG clamped   - NPO   - UOP of 1500cc overnight, Mcdonald   - right IJ TLC   - level 2 mobility-->level 3   - VD #3   - CXR(reviewed): large MAYCO opacity, increased interstitial markings noted   - SBT 5/10 for last 2 hours   - lovenox/pepcid   - day 2 of C3/azithromycin   - diamox 250mg BID   - Phos 2.2   - K 3.7      Yesterday's Events:   - s/p left thoracentesis with removal of 1.6 liters blood fluid   - improvement to hemodynamics and vent mechanics after thoracentesis   - s/p bronchoscopy with BAL to MAYCO and RUL   - sedated after bronc, w/d    - Ketamine at 0.3, fent at 50   - SR 80s   - levo at 4   - vaso 0.03   -    - Tmax 38.3   - OG clamped, NPO   - UOP of 700cc overnight with mcdonald   - vent day#2   - PEEP 14, FiO2 100%   - CXR(reviewed): appears to  have a large MAYCO opacity vs mass   - pepcid, lovenox   - day 1 of azithromycin/ceftriaxone   - solumedrol/lasix   - Na 128   - K 4.8   - lactate 2.5   - Mg 2.5   - WBCs 21    Review of Systems  Review of Systems   Unable to perform ROS: Intubated        Vital Signs for last 24 hours   Temp:  [37.5 °C (99.5 °F)-38.3 °C (100.9 °F)] 37.5 °C (99.5 °F)  Pulse:  [] 102  Resp:  [7-30] 14  BP: ()/(50-66) 95/57  SpO2:  [85 %-100 %] 93 %    Hemodynamic parameters for last 24 hours  CVP:  [9 MM HG-23 MM HG] 10 MM HG    Respiratory Information for the last 24 hours  Vent Mode: ASV  Rate (breaths/min): 20  Vt Target (mL): 400  PEEP/CPAP: 10  MAP: 14  Control VTE (exp VT): 551    Physical Exam   Physical Exam  Vitals and nursing note reviewed.   Constitutional:       General: He is not in acute distress.     Appearance: He is ill-appearing. He is not toxic-appearing.   HENT:      Head: Normocephalic and atraumatic.      Right Ear: External ear normal.      Left Ear: External ear normal.      Nose: Nose normal. No rhinorrhea.      Mouth/Throat:      Mouth: Mucous membranes are moist.      Comments: ETT in place  Eyes:      General: No scleral icterus.     Conjunctiva/sclera: Conjunctivae normal.      Pupils: Pupils are equal, round, and reactive to light.   Neck:      Comments: Right IJ TLC  Cardiovascular:      Rate and Rhythm: Normal rate and regular rhythm.      Pulses:           Radial pulses are 1+ on the right side and 1+ on the left side.        Dorsalis pedis pulses are 2+ on the right side and 2+ on the left side.      Heart sounds: Heart sounds are distant. No murmur heard.  Pulmonary:      Breath sounds: No wheezing.      Comments: Breathing comfortably on the vent, coarse anteriorly  Chest:      Chest wall: No tenderness.   Abdominal:      Palpations: Abdomen is soft.      Tenderness: There is no abdominal tenderness. There is no guarding or rebound.   Genitourinary:     Comments: Hurt in  place  Musculoskeletal:         General: Normal range of motion.      Cervical back: Normal range of motion and neck supple.      Right lower le+ Edema present.      Left lower le+ Edema present.   Lymphadenopathy:      Cervical: No cervical adenopathy.   Skin:     General: Skin is warm and dry.      Capillary Refill: Capillary refill takes less than 2 seconds.      Findings: No rash.   Neurological:      Mental Status: He is oriented to person, place, and time.      Cranial Nerves: No cranial nerve deficit.      Sensory: No sensory deficit.      Motor: No weakness.      Comments: Following all commands   Psychiatric:      Comments: Unable to assess         Medications  Current Facility-Administered Medications   Medication Dose Route Frequency Provider Last Rate Last Admin   • acetaZOLAMIDE (DIAMOX) injection 250 mg  250 mg Intravenous Q12HRS Reva Barbosa M.D.       • ipratropium-albuterol (DUONEB) nebulizer solution  3 mL Nebulization Q4HRS (RT) Alana Wong M.D.   3 mL at 22 0216   • ipratropium-albuterol (DUONEB) nebulizer solution  3 mL Nebulization Q2HRS PRN (RT) Alana Wong M.D.   3 mL at 22 0227   • methylPREDNISolone (SOLU-MEDROL) 40 MG injection 40 mg  40 mg Intravenous DAILY Alana Wong M.D.   40 mg at 22 0618   • fentanyl 50 mcg/mL infusion   mcg/hr Intravenous Continuous Reva Barbosa M.D.   Stopped at 22 0930   • vasopressin (VASOSTRICT) 20 Units in  mL Infusion  0.03 Units/min Intravenous Continuous Maranda Khan M.D.   Stopped at 22 0943   • azithromycin (ZITHROMAX) 500 mg in D5W 250 mL IVPB premix  500 mg Intravenous Q24HRS Maranda Khan M.D.   Stopped at 22 0700   • ketamine (Ketalar) 500 mg/250 mL infusion  0-2.5 mg/kg/hr (Adjusted) Intravenous Continuous Reva Barbosa M.D.   Stopped at 22 0931   • enoxaparin (Lovenox) inj 40 mg  40 mg Subcutaneous DAILY AT 1800 Reva Barbosa M.D.   40 mg at  07/22/22 1709   • cefTRIAXone (Rocephin) syringe 1 g  1 g Intravenous Q24HRS Reva Barbosa M.D.   1 g at 07/23/22 0615   • acetaminophen (Tylenol) tablet 650 mg  650 mg Enteral Tube Q6HRS PRN Reva Barbosa M.D.   650 mg at 07/22/22 1036   • norepinephrine (Levophed) 8 mg in 250 mL NS infusion (premix)  0-40 mcg/min Intravenous Continuous Maranda Khan M.D.   Stopped at 07/22/22 1739   • Respiratory Therapy Consult   Nebulization Continuous RT Maranda Khan M.D.       • famotidine (PEPCID) tablet 20 mg  20 mg Enteral Tube Q12HRS Maranda Khan M.D.   20 mg at 07/22/22 1709    Or   • famotidine (PEPCID) injection 20 mg  20 mg Intravenous Q12HRS Maranda Khan M.D.   20 mg at 07/23/22 0610   • senna-docusate (PERICOLACE or SENOKOT S) 8.6-50 MG per tablet 2 Tablet  2 Tablet Enteral Tube BID Maranda hKan M.D.   2 Tablet at 07/23/22 0621    And   • polyethylene glycol/lytes (MIRALAX) PACKET 1 Packet  1 Packet Enteral Tube QDAY PRN Maranda Khan M.D.        And   • magnesium hydroxide (MILK OF MAGNESIA) suspension 30 mL  30 mL Enteral Tube QDAY PRN Maranda Khan M.D.        And   • bisacodyl (DULCOLAX) suppository 10 mg  10 mg Rectal QDAY PRN Maranda Khan M.D.       • MD Alert...ICU Electrolyte Replacement per Pharmacy   Other PHARMACY TO DOSE Maranda Khan M.D.       • lidocaine (XYLOCAINE) 1 % injection 2 mL  2 mL Tracheal Tube Q30 MIN PRN Maranda Khan M.D.           Fluids    Intake/Output Summary (Last 24 hours) at 7/23/2022 0642  Last data filed at 7/23/2022 0600  Gross per 24 hour   Intake 1553.58 ml   Output 2225 ml   Net -671.42 ml       Laboratory  Recent Labs     07/22/22  0527 07/23/22  0000 07/23/22  0435   ISTATAPH 7.334* 7.537* 7.519*   ISTATAPCO2 61.8* 46.3* 50.0*   ISTATAPO2 73 61* 58*   ISTATATCO2 35* 41* 42*   UKPVFVY2EAQ 93 93 92*   ISTATARTHCO3 32.9* 39.3* 40.7*   ISTATARTBE 5* 15* 16*   ISTATTEMP 37.8 C 37.7 C 37.4 C   ISTATFIO2 90 60 60   ISTATSPEC  Arterial Arterial Arterial   ISTATAPHTC 7.322* 7.526* 7.513*   RGVVRUUV0PG 77 64 59*     Recent Labs     07/21/22 2228 07/22/22 0245   CPKTOTAL 88 124     Recent Labs     07/21/22 2228 07/22/22 0245 07/23/22  0434   SODIUM 128* 128* 132*   POTASSIUM 4.2 4.8 3.7   CHLORIDE 85* 89* 90*   CO2 36* 29 33   BUN 15 17 17   CREATININE 0.70 0.57 0.44*   MAGNESIUM 3.3* 2.5 2.0   PHOSPHORUS 4.9* 4.0 2.2*   CALCIUM 8.7 8.2* 8.4*     Recent Labs     07/21/22 2228 07/22/22 0245 07/23/22  0434   ALTSGPT 31  --   --    ASTSGOT 36  --   --    ALKPHOSPHAT 99  --   --    TBILIRUBIN 0.5  --   --    GLUCOSE 224* 168* 112*     Recent Labs     07/21/22 2228 07/22/22 0245 07/23/22  0434   WBC 18.5* 21.2* 11.8*   NEUTSPOLYS 87.00* 90.40* 84.50*   LYMPHOCYTES 6.90* 0.90* 4.60*   MONOCYTES 3.50 8.70 10.30   EOSINOPHILS 0.00 0.00 0.00   BASOPHILS 0.00 0.00 0.00   ASTSGOT 36  --   --    ALTSGPT 31  --   --    ALKPHOSPHAT 99  --   --    TBILIRUBIN 0.5  --   --      Recent Labs     07/21/22 2228 07/22/22 0210 07/22/22 0245 07/23/22  0434   RBC 4.58*  --  4.75 4.05*   HEMOGLOBIN 14.5  --  14.8 12.6*   HEMATOCRIT 44.3  --  44.8 38.5*   PLATELETCT 248  --  268 175   PROTHROMBTM  --  13.9  --   --    INR  --  1.08  --   --        Imaging  ECHO:  CONCLUSIONS  Stat echo ordered for shock. Markedly enlarged RA/RV with reduced RV systolic function. LV shwoing hyperdynamic function. LVEF 70% with mild concentric LVH. There is cavitary obliteration with flattened   interventricular septum looks to be intefering with LV filling. Dilated IVC consistent with volume overload. Overall picture consistent with cor pulmonale. No prior study is available for comparison.     CTA thoracoabdominal:  1.  No aortic aneurysm or dissection identified.  2.  Large layering left pleural effusion  3.  Collapse of the left upper lobe without visualized bronchograms, could represent endobronchial obstruction from mucous or endobronchial lesion. Recommend bronchoscopy  for further evaluation.  4.  Rightward shift of the mediastinum, likely due to large left effusion.  5.  Extensive emphysema  6.  Fat density mass in the duodenal lumen, appearance suggests lipoma.  7.  Diverticulosis  8.  Fat-containing umbilical hernia  9.  Atherosclerosis and atherosclerotic coronary artery disease    Assessment/Plan  * Cardiac arrest (HCC)- (present on admission)  Assessment & Plan  PEA cardiac arrest likely due to hypoxia  S/p ACLS/CPR/epi  No need for hypothermia protocols  Supportive care    Hypokalemia- (present on admission)  Assessment & Plan  Replete to goal > 4    Hyponatremia- (present on admission)  Assessment & Plan  ?SIADH related to lung disease  Following   Improving after Lasix    Centrilobular emphysema (HCC)- (present on admission)  Assessment & Plan  Noted on CT chest   Steroids/Bronchodilators  RT/O2 protocols  On home O2, no documentation of pulmonary visits or PFTs      Atelectasis of left lung- (present on admission)  Assessment & Plan  Compressive, ?obstructive-->?underlying mass  S/p bronchoscopy with BAL on 7/22 with suspicious mucosa to MAYCO  Cont ceftriaxone/azithromycin  Follow cultures and cytology from BAL  Will need repeat bronch and brushings/biopsies.    Pleural effusion on left- (present on admission)  Assessment & Plan  S/p thoracentesis on 7/22 with 1.6 liters removed, bloody fluid  Concerning for underlying malignancy  Follow cultures, cytology      Acute on chronic respiratory failure with hypoxia and hypercapnia (HCC)- (present on admission)  Assessment & Plan  Intubated on 7/21 in the field  Cont full vent support  RT/O2 protocols  Vent bundle protocols  Lung protective ventilator strategies  Steroids/Bronchodilators  Bronchoscopy with BAL/cytology on 7/22, cont Ceftriaxone/azithromycin  SAT/SBTs  Diuresis with Lasix-->change to Diamox today       VTE:  Lovenox  Ulcer: H2 Antagonist  Lines: Central Line  Ongoing indication addressed, Arterial Line  Ongoing  indication addressed and Hurt Catheter  Ongoing indication addressed    I have performed a physical exam and reviewed and updated ROS and Plan today (7/23/2022). In review of yesterday's note (7/22/2022), there are no changes except as documented above.     Discussed patient condition and risk of morbidity and/or mortality with Family, RN, RT, Pharmacy, Charge nurse / hot rounds and Patient    The patient remains critically ill.  I have assessed and reassessed the respiratory status and made ventilator adjustments based upon arterial blood gas analysis, ventilator waveforms and airway mechanics.  I have assessed and reassessed the blood pressure, hemodynamics, cardiovascular status. This patient remains at high risk for worsening cardiopulmonary dysfunction and death without the above critical care interventions.    Additional critical care time to Dr. Khan earlier this morning = 85 minutes in directly providing and coordinating critical care and extensive data review.  No time overlap and excludes procedures.

## 2022-07-23 NOTE — CARE PLAN
Problem: Ventilation  Goal: Ability to achieve and maintain unassisted ventilation or tolerate decreased levels of ventilator support  Description: Target End Date:  4 days     Document on Vent flowsheet    1.  Support and monitor invasive and noninvasive mechanical ventilation  2.  Monitor ventilator weaning response  3.  Perform ventilator associated pneumonia prevention interventions  4.  Manage ventilation therapy by monitoring diagnostic test results  7/23/2022 1552 by Kwadwo Alarcon, RRT  Outcome: Not Met  7/23/2022 1453 by Kwadwo Alarcon, RRT  Outcome: Not Met  7/23/2022 1453 by Kwadwo Alarcon, RRT  Outcome: Not Met      Ventilator Daily Summary    Vent Day #  3  Ventilator settings changed this shift:  To CMV  Weaning trials:  yes  Respiratory Procedures:  no  Plan: Continue current ventilator settings and wean mechanical ventilation as tolerated per physician orders.   24 420 10 60

## 2022-07-23 NOTE — CARE PLAN
Problem: Ventilation  Goal: Ability to achieve and maintain unassisted ventilation or tolerate decreased levels of ventilator support  Description: Target End Date:  4 days     Document on Vent flowsheet    1.  Support and monitor invasive and noninvasive mechanical ventilation  2.  Monitor ventilator weaning response  3.  Perform ventilator associated pneumonia prevention interventions  4.  Manage ventilation therapy by monitoring diagnostic test results  7/23/2022 1453 by Kwadwo Alarcon, RRT  Outcome: Not Met  7/23/2022 1453 by Kwadwo Alarcon, RRT  Outcome: Not Met      Ventilator Daily Summary    Vent Day #  3  Ventilator settings changed this shift:  To CMV  Weaning trials:  yes  Respiratory Procedures:  no  Plan: Continue current ventilator settings and wean mechanical ventilation as tolerated per physician orders.   24 420 10 60

## 2022-07-24 PROBLEM — Z71.89 GOALS OF CARE, COUNSELING/DISCUSSION: Status: ACTIVE | Noted: 2022-01-01

## 2022-07-24 PROBLEM — I27.20 PULMONARY HYPERTENSION (HCC): Status: ACTIVE | Noted: 2022-01-01

## 2022-07-24 NOTE — PROGRESS NOTES
"Pulmonary/Critical Care Medicine   Progress Note    Date of service: 7/24/2022  Time: 1445    Lengthy discussion with Guille and Palliative Care, RN, at the bedside about further goals of care given that Guille has some increased work of breathing and now on HFNC at 60L/100%.  Guille expressed that he does not want to be \"on life support\" and does not want to go back on the ventilator.  He is very aware of his end stage COPD and pulmonary hypertension as well as Stage IV cancer.  He is at peace and knows that going back on the ventilator will prolong the inevitable.  He denies any fears and feels that his life is complete.  He does not feel overwhelmed.  He desires an ice cold Budlight which we plan to get him.    Code status changed to DNR/DNI  Will add small amount of Morphine for work of breathing and dyspnea.    Reva Barbosa MD  Pulmonary and Critical Care Medicine  "

## 2022-07-24 NOTE — ASSESSMENT & PLAN NOTE
Severe in nature with cor pulmonale due to severe lung disease   Fairly poor prognosis  Cont O2 therapy  Aggressive diuresis ongoing

## 2022-07-24 NOTE — PROGRESS NOTES
"[facility] Progress Note      Admit Date: 7/21/2022    Resident(s): Jesse Meyer M.D.   Attending:  TRISTA KAPLAN/ Dr. Reva Barbosa    Patient ID:    Name:  Aneudy Greenwood     YOB: 1945  Age:  77 y.o.  male   MRN:  [id number]    Hospital Course (carried forward and updated):  Patient is a 76yo male with COPD and CHRF that was originally complaining of dyspnea for 1-2 weeks with significant worsening 1-2 days prior. Patient was found by EMS and immediately required intubation, as unable to maintain sats on BiPAP. Arrived at ED and went into cardiac arrest. ACLS was initiated, ROSC achieved, and R IJ placed with initiation of norepi for pressure support. EEG performed showing diffuse background slowing.    On admission: CXR showing pulm edema, b/l pleural effusions, and L superior mediastinal contour enlargement. CT head neg. CTA chest/abd showing L pleural effusion, MAYCO collapse, emphysematous changes, R mediastinal shift, and b/l basilar densities. TTE showing EF 70%, enlarged RA and RV with \"D sign,\" and dilated IVC. Trop 76. . D-dimer 3.83. Procal 0.14. ABG showing resp acidosis. WBC 18.5. Na 128. CO2 36. LA 2.4. UA neg.    Received Lasix 40mg IV x1, LR 500mL x1, methylpred 125mg IV x1, Vanc/Zosyn x1, and 2 amps bicarb. Underwent thoracentesis with removal of 1.6L of bloody fluid; sent for analysis and cytology.    Consultants:  Critical Care  Neurology    Interval Events:  07/22: Intubated. RR 20, , PEEP 14, FiO2 90%. Bronchoscopy performed at bedside; BAL sent for analysis and cytology. DVT US neg. Pleural fluid analysis showing exudative effusion, likely malignancy.    07/23: Weaned off of pressors and sedation; following commands. UOP 1500. CXR with MAYCO opacity and increased interstitial markings. Started on acetazolamide 250mg q12h. Bronch path showing carcinoma of L lung.    07/24: Afib w/ RVR overnight; amio 150mg IV x1 and started on amio gtt. Net -490mL (UOP 2.2L). " Extubated this morning. On HFNC 35L FiO2 100%. A&Ox4. CXR relatively unchanged. Pathology of L lung BAL showing carcinoma; likely stage 4 d/t blood pleural effusion on thoracentesis. Patient changed code status from full code to DNAR, I OK.    NEURO: A&Ox4  CARDIOVASC: HR 60s-130s, BP 70s-120s/30s-70s  RESPIRATORY: RR 16-37; now on HFNC  GI/NUTRITION: NPO  RENAL/FLUID/LYTES:  HEME/ONC: WBC 13.6, down from 11.8 yesterday  INFECTIOUS D: C3/azithro day 3  ENDOCRINE:    Vitals Range last 24h:  Temp:  [37.4 °C (99.3 °F)-37.8 °C (100 °F)] 37.8 °C (100 °F)  Pulse:  [] 96  Resp:  [18-37] 24  BP: ()/() 143/83  SpO2:  [85 %-99 %] 85 %      Intake/Output Summary (Last 24 hours) at 7/24/2022 1241  Last data filed at 7/24/2022 1200  Gross per 24 hour   Intake 2093.03 ml   Output 2930 ml   Net -836.97 ml        Limited ROS; patient recently extubated.  Review of Systems   Constitutional: Negative for chills and fever.   Respiratory: Positive for shortness of breath. Negative for cough.    Cardiovascular: Negative for chest pain and palpitations.   Gastrointestinal: Negative for abdominal pain, nausea and vomiting.       PHYSICAL EXAM:  Vitals:    07/24/22 1000 07/24/22 1100 07/24/22 1200 07/24/22 1225   BP: (!) 160/123 (!) 154/76 (!) 143/83    Pulse: 99 97 96    Resp: (!) 35 19 (!) 24    Temp:       TempSrc: Bladder  Bladder    SpO2: 91% 92% 91% (!) 85%   Weight:       Height:        Body mass index is 27.25 kg/m².    O2 therapy: Pulse Oximetry: (!) 85 %, O2 (LPM): 60, O2 Delivery Device: Heated High Flow Nasal Cannula    Date 07/24/22 0700 - 07/25/22 0659   Shift 3361-1953 4587-5320 7748-0454 24 Hour Total   INTAKE   I.V. 68   68     Amiodarone Volume 68   68   NG/   300     Intake (mL) (Enteral Tube 07/23/22 Cortrak - Gastric  Right nare) 300   300   IV Piggyback 10   10     Volume (mL) (cefTRIAXone (Rocephin) syringe 1 g) 10   10   Shift Total 378   378   OUTPUT   Urine 725   725     Output (mL)  (Urethral Catheter Temperature probe 16 Fr.) 725   725   Shift Total 725   725   NET -384 -117        Physical Exam  Constitutional:       General: He is not in acute distress.     Appearance: He is ill-appearing.   HENT:      Head: Normocephalic and atraumatic.      Nose:      Comments: NG tube     Mouth/Throat:      Mouth: Mucous membranes are dry.   Eyes:      General: No scleral icterus.     Extraocular Movements: Extraocular movements intact.      Pupils: Pupils are equal, round, and reactive to light.   Cardiovascular:      Rate and Rhythm: Normal rate and regular rhythm.      Heart sounds: Normal heart sounds. No murmur heard.    No friction rub. No gallop.   Pulmonary:      Effort: No respiratory distress.      Breath sounds: Rhonchi (expiratory) present. No wheezing or rales.      Comments: Diminished breath sounds, L>R  Abdominal:      General: There is distension.      Palpations: Abdomen is soft.      Comments: Umbilical hernia   Musculoskeletal:      Right lower leg: Edema (1+) present.      Left lower leg: Edema (1+) present.   Lymphadenopathy:      Cervical: No cervical adenopathy.   Skin:     General: Skin is warm and dry.      Coloration: Skin is not jaundiced.   Neurological:      Mental Status: He is alert.         Recent Labs     07/22/22  0527 07/23/22  0000 07/23/22  0435   ISTATAPH 7.334* 7.537* 7.519*   ISTATAPCO2 61.8* 46.3* 50.0*   ISTATAPO2 73 61* 58*   ISTATATCO2 35* 41* 42*   [id number] 93 93 92*   ISTATARTHCO3 32.9* 39.3* 40.7*   ISTATARTBE 5* 15* 16*   ISTATTEMP 37.8 C 37.7 C 37.4 C   ISTATFIO2 90 60 60   ISTATSPEC Arterial Arterial Arterial   ISTATAPHTC 7.322* 7.526* 7.513*   [id number] 77 64 59*     Recent Labs     07/22/22  0245 07/23/22  0434 07/23/22  1730 07/24/22  0400   SODIUM 128* 132* 133* 131*   POTASSIUM 4.8 3.7 4.2 4.0   CHLORIDE 89* 90* 92* 93*   CO2 29 33 31 29   BUN 17 17 15 20   CREATININE 0.57 0.44* 0.47* 0.37*   MAGNESIUM 2.5 2.0  --  2.1   PHOSPHORUS 4.0 2.2*   --  2.9   CALCIUM 8.2* 8.4* 8.6 8.6     Recent Labs     07/21/22 2228 07/22/22 0245 07/23/22 0434 07/23/22  1730 07/24/22  0400   ALTSGPT 31  --   --   --   --    ASTSGOT 36  --   --   --   --    ALKPHOSPHAT 99  --   --   --   --    TBILIRUBIN 0.5  --   --   --   --    PREALBUMIN  --   --   --   --  12.0*   GLUCOSE 224*   < > 112* 126* 146*    < > = values in this interval not displayed.     Recent Labs     07/22/22 0210 07/22/22 0245 07/23/22 0434 07/24/22 0400   RBC  --  4.75 4.05* 4.34*   HEMOGLOBIN  --  14.8 12.6* 13.6*   HEMATOCRIT  --  44.8 38.5* 41.3*   PLATELETCT  --  268 175 177   PROTHROMBTM 13.9  --   --   --    INR 1.08  --   --   --      Recent Labs     07/21/22 2228 07/22/22 0245 07/23/22 0434 07/24/22  0400   WBC 18.5* 21.2* 11.8* 13.6*   NEUTSPOLYS 87.00* 90.40* 84.50* 84.00*   LYMPHOCYTES 6.90* 0.90* 4.60* 5.00*   MONOCYTES 3.50 8.70 10.30 10.20   EOSINOPHILS 0.00 0.00 0.00 0.20   BASOPHILS 0.00 0.00 0.00 0.10   ASTSGOT 36  --   --   --    ALTSGPT 31  --   --   --    ALKPHOSPHAT 99  --   --   --    TBILIRUBIN 0.5  --   --   --        Meds:  • acetaZOLAMIDE (DIAMOX) IV  250 mg     • Pharmacy  1 Each     • ipratropium-albuterol  3 mL     • amiodarone infusion  0.5-1 mg/min 0.5 mg/min (07/24/22 0435)   • methylPREDNISolone  40 mg     • enoxaparin (LOVENOX) injection  40 mg     • cefTRIAXone (ROCEPHIN) IV  1 g     • acetaminophen  650 mg     • Respiratory Therapy Consult       • senna-docusate  2 Tablet      And   • polyethylene glycol/lytes  1 Packet      And   • magnesium hydroxide  30 mL      And   • bisacodyl  10 mg     • MD Alert...Adult ICU Electrolyte Replacement per Pharmacy            Procedures:  07/22 bronchoscopy with BAL    Imaging:  DX-CHEST-PORTABLE (1 VIEW)   Final Result      No significant interval change.      DX-ABDOMEN FOR TUBE PLACEMENT   Final Result         Feeding tube with tip projecting over the expected area of the stomach.      DX-CHEST-PORTABLE (1 VIEW)   Final  Result         1.  Pulmonary edema and/or infiltrate, stable since prior study.   2.  Small bilateral pleural effusion.   3.  Enlargement of the left superior mediastinal contour, compatible with upper lobe collapse on recent CT.   4.  Cardiomegaly      US-EXTREMITY VENOUS LOWER BILAT   Final Result      DX-CHEST-PORTABLE (1 VIEW)   Final Result         1.  Pulmonary edema and/or infiltrate, stable since prior study.   2.  Small bilateral pleural effusion.   3.  Enlargement of the left superior mediastinal contour, compatible with upper lobe collapse on recent CT.   4.  Cardiomegaly      EC-ECHOCARDIOGRAM COMPLETE W/O CONT   Final Result      DX-CHEST-PORTABLE (1 VIEW)   Final Result         1.  Pulmonary edema and/or infiltrate.   2.  Moderate left and small right pleural effusion.   3.  Enlargement of the left superior mediastinal contour, compatible with upper lobe collapse on recent CT.   4.  Cardiomegaly      CT-CTA COMPLETE THORACOABDOMINAL AORTA   Final Result         1.  No aortic aneurysm or dissection identified.   2.  Large layering left pleural effusion   3.  Collapse of the left upper lobe without visualized bronchograms, could represent endobronchial obstruction from mucous or endobronchial lesion. Recommend bronchoscopy for further evaluation.   4.  Rightward shift of the mediastinum, likely due to large left effusion.   5.  Extensive emphysema   6.  Fat density mass in the duodenal lumen, appearance suggests lipoma.   7.  Diverticulosis   8.  Fat-containing umbilical hernia   9.  Atherosclerosis and atherosclerotic coronary artery disease      CT-HEAD W/O   Final Result         1.  No acute intracranial abnormality is identified, there are nonspecific white matter changes, commonly associated with small vessel ischemic disease.  Associated mild cerebral atrophy is noted.   2.  Atherosclerosis.         DX-CHEST-PORTABLE (1 VIEW)   Final Result         1.  Pulmonary edema and/or infiltrate.   2.   Moderate left and small right pleural effusion.   3.  Enlargement of the left superior mediastinal contour, appearance suggests thoracic aortic aneurysm. Could be further evaluated with CT angiogram of the chest for evaluation of the aorta.   4.  Cardiomegaly   5.  Nasogastric tube terminates at the gastroesophageal junction, recommend a patent      These findings were discussed with the patient's clinician, Jung Hall, on 7/21/2022 10:28 PM.          ASSESSEMENT and PLAN:    * Cardiac arrest (HCC)- (present on admission)  Assessment & Plan  PEA cardiac arrest likely due to hypoxia  S/p ACLS/CPR/epi  No need for hypothermia protocols  Supportive care    Acute on chronic respiratory failure with hypoxia and hypercapnia (HCC)- (present on admission)  Assessment & Plan  Intubated on 7/21 in the field  Cont full vent support  RT/O2 protocols  Vent bundle protocols  Lung protective ventilator strategies  Steroids/Bronchodilators  Bronchoscopy with BAL/cytology on 7/22, cont Ceftriaxone/azithromycin  BAL pathology of L lung showing carcinoma; likely stage 4 d/t bloody pleural effusion on thoracentesis  SAT/SBTs  Lasix discontinued 07/23 and started on acetazolamide 250mg q12h  Cont acetazolamide 250mg IV q12h    Hypokalemia- (present on admission)  Assessment & Plan  Replete to goal > 4    Hyponatremia- (present on admission)  Assessment & Plan  ?SIADH related to lung disease  Following   Improved after Lasix; discontinued 07/23 and started on acetazolamide    Centrilobular emphysema (HCC)- (present on admission)  Assessment & Plan  Noted on CT chest   Steroids/Bronchodilators  RT/O2 protocols  On home O2, no documentation of pulmonary visits or PFTs      Atelectasis of left lung- (present on admission)  Assessment & Plan  Compressive, ?obstructive-->?underlying mass  S/p bronchoscopy with BAL on 7/22 with suspicious mucosa to MAYCO  Cont ceftriaxone/azithromycin  Follow cultures and cytology from BAL  BAL  pathology of L lung showing carcinoma; likely stage 4 d/t bloody pleural effusion on thoracentesis  Will need repeat bronch and brushings/biopsies.    Pleural effusion on left- (present on admission)  Assessment & Plan  S/p thoracentesis on 7/22 with 1.6 liters removed, bloody fluid  Concerning for underlying malignancy  Follow cultures, cytology  BAL pathology of L lung showing carcinoma; likely stage 4 d/t bloody pleural effusion on thoracentesis  Palliative care consult      CODE STATUS: DNAR, I OK    Quality Measures:  Feeding: NPO  Analgesia: Tylenol  Sedation: none  Thromboprophylaxis: enoxaparin  Head of bed: >30 degrees  Ulcer prophylaxis: famotidine  Glycemic control: none  Bowel care: bowel regimen  Indwelling lines: R IJ, R radial art  Deescalation of antibiotics: C3/reymundo Meyer M.D.

## 2022-07-24 NOTE — PROGRESS NOTES
Evening update:    Is notified by the bedside RN at approximately 8 PM that the patient had flipped in A. fib with RVR.  Initially heart rates were managing a center greater to the one-teens.  Initially I opted to not intervene.  EKG was performed that confirmed A. fib with RVR.  Around an hour later, the patient started having heart rates climbed into the 120s.  He was bolused with 150 mg IV of amiodarone.  This temporize things and his heart rate initially settled into the low 100s.  However, his heart rate started to gradually climb into the low 120s again and the decision was made to start an amiodarone drip.    Critical care time: 15 minutes    Ventura Richards DO  Staff Pulmonologist and Intensivist  [facility]     Please note that this dictation was created using voice recognition software. The accuracy of the dictation is limited to the abilities of the software. I have made every reasonable attempt to correct obvious errors, but I expect that there are errors of grammar and possibly content that I did not discover before finalizing the note.

## 2022-07-24 NOTE — CARE PLAN
The patient is Watcher - Medium risk of patient condition declining or worsening    Shift Goals  Clinical Goals: Hemodynamic stability, MAP >65 or SBP >90, RASS 0  Patient Goals: rest and recover  Family Goals: MAEVE    Progress made toward(s) clinical / shift goals:        Problem: Knowledge Deficit - Standard  Goal: Patient and family/care givers will demonstrate understanding of plan of care, disease process/condition, diagnostic tests and medications  Outcome: Progressing     Problem: Skin Integrity  Goal: Skin integrity is maintained or improved  Outcome: Progressing     Problem: Hemodynamics  Goal: Patient's hemodynamics, fluid balance and neurologic status will be stable or improve  Outcome: Progressing     Problem: Mechanical Ventilation  Goal: Safe management of artificial airway and ventilation  Outcome: Progressing  Goal: Patient will be able to express needs and understand communication  Outcome: Progressing       Patient is not progressing towards the following goals:

## 2022-07-24 NOTE — ASSESSMENT & PLAN NOTE
7/24:   I discussed Guille's diagnosis with him and explained the severity and the prognosis associated with what is likely primary lung cancer with a malignant pleural effusion which by definition makes this a stage IV cancer.  I explained that most treatments at this point would be for palliative reasons such as chemotherapy as they would give him time; however, he would have to be healthy/strong enough to even be considered to start these therapies.  I also discussed that he would likely be a good candidate for a Pleurex catheter for the effusion as I suspect this will continue to reaccumulate and being able to drain this fluid will help and assist with work of breathing.  Lastly, I discussed code status and explained that performing CPR was non beneficial care should his heart stop and he did agree with this.  He is still considering whether or not he would like to be reintubated so he is OK with it for now.  I did bring up Hospice and he is not interested at this time.  He was amenable to seeing Palliative care.

## 2022-07-24 NOTE — PROGRESS NOTES
Critical Care Progress Note    Date of admission  7/21/2022    Chief Complaint  77 y.o. male admitted 7/21/2022 with acute on chronic hypercapnic and hypoxic respiratory failure    Hospital Course  Mr. Greenwood is a 77 year old male with the past medical history of COPD on home O2 who had reportedly complained of worsening shortness of breath for the last 1-2 weeks that acutely worsened in the last 2 days prior to admission. EMS was activated and found the patient lethargic with low O2 sats.  He was placed on BiPAP without improvement of O2 sats and intubated in the field. Upon arrival to the ER, the patient had a PEA cardiac arrest requiring 2 rounds of CPR/epi.  He was found to have a large left sided pleural effusion with compressive atelectasis.  He was admitted to the ICU.  He did not need hypothermia protocols as he was following commands.  7/22:  VD#2, s/p left thora with 1.6 liters out, s/p bronch with BAL, started Lasix diuresis, day 1 of abx  7/23: VD#3, SBT for 4 hours with FVC at 0.7, pathology (+) for malignant cells in pleural fluid    Interval Problem Update  Reviewed last 24 hour events:   - went into a-fib with RVR last night-->amio   - off sedation, fowolling all commands   - a-fib 90-110s   - SBP 90-110s --> a little hypertensive to 150-170s   - TFs at goals   - UOp of 1400cc overnight, Hurt   - BM pta   - mobilizing   - vent day #4   - SBT-->extubated to high flow   - CXR(reviewed): worsening cephalization, MAYCO atelectasis, left pleural   - HFNC at 45 at 100%   - PEP    - lovenox/pepcid   - day 3/5 of ceftriaxone, s/p azithromycin   - Diamox   - WBCs 13   - K 4   - Mg 2.1   - creat 0.37    **11:30am:  I discussed Guille's diagnosis with him and explained the severity and the prognosis associated with what is likely primary lung cancer with a malignant pleural effusion which by definition makes this a stage IV cancer.  I explained that most treatments at this point would be for palliative reasons  such as chemotherapy as they would give him time; however, he would have to be healthy/strong enough to even be considered to start these therapies.  I also discussed that he would likely be a good candidate for a Pleurex catheter for the effusion as I suspect this will continue to reaccumulate and being able to drain this fluid will help and assist with work of breathing.  Lastly, I discussed code status and explained that performing CPR was non beneficial care should his heart stop and he did agree with this.  He is still considering whether or not he would like to be reintubated so he is OK with it for now.  I did bring up Hospice and he is not interested at this time.  He was amenable to seeing Palliative care.    Yesterday's Events:   - no events overnight   - off sedation, following all commands, writing notes   - SR 90s   - SBP 90-100s   - off pressors   - Tmax 99.9   - BM pta   - OG clamped   - NPO   - UOP of 1500cc overnight, Hurt   - right IJ TLC   - level 2 mobility-->level 3   - VD #3   - CXR(reviewed): large MAYCO opacity, increased interstitial markings noted   - SBT 5/10 for last 2 hours   - lovenox/pepcid   - day 2 of C3/azithromycin   - diamox 250mg BID   - Phos 2.2   - K 3.7    Review of Systems  Review of Systems   Constitutional: Positive for malaise/fatigue and weight loss. Negative for chills and fever.   HENT: Negative for congestion and sore throat.    Eyes: Negative for pain and discharge.   Respiratory: Positive for cough, hemoptysis, sputum production and shortness of breath.    Cardiovascular: Positive for leg swelling. Negative for chest pain.   Gastrointestinal: Negative for abdominal pain, diarrhea, nausea and vomiting.   Genitourinary: Negative for flank pain and hematuria.   Musculoskeletal: Negative for back pain and neck pain.   Skin: Negative for rash.   Neurological: Positive for weakness. Negative for dizziness and headaches.   Endo/Heme/Allergies: Does not bruise/bleed easily.    Psychiatric/Behavioral: Negative for depression. The patient is not nervous/anxious.         Vital Signs for last 24 hours   Temp:  [37.4 °C (99.3 °F)-37.8 °C (100 °F)] 37.8 °C (100 °F)  Pulse:  [] 96  Resp:  [16-37] 23  BP: ()/(48-77) 100/65  SpO2:  [90 %-99 %] 96 %    Hemodynamic parameters for last 24 hours  CVP:  [-2 MM HG-19 MM HG] 11 MM HG    Respiratory Information for the last 24 hours  Vent Mode: APVCMV  Rate (breaths/min): 24  Vt Target (mL): 420  PEEP/CPAP: 10  P Support: 10  MAP: 21  Length of Weaning Trial (Hours): 2.5  Control VTE (exp VT): 416    Physical Exam   Physical Exam  Vitals and nursing note reviewed.   Constitutional:       General: He is not in acute distress.     Appearance: He is ill-appearing. He is not toxic-appearing.      Comments: Appears chronically ill   HENT:      Head: Normocephalic and atraumatic.      Right Ear: External ear normal.      Left Ear: External ear normal.      Nose: Nose normal. No rhinorrhea.      Comments: HFNC in place     Mouth/Throat:      Mouth: Mucous membranes are moist.      Pharynx: Oropharynx is clear. No oropharyngeal exudate.      Comments: edentulous  Eyes:      General: No scleral icterus.     Conjunctiva/sclera: Conjunctivae normal.      Pupils: Pupils are equal, round, and reactive to light.   Neck:      Comments: Right IJ TLC  Cardiovascular:      Rate and Rhythm: Tachycardia present. Rhythm irregularly irregular.      Pulses:           Radial pulses are 1+ on the right side and 1+ on the left side.        Dorsalis pedis pulses are detected w/ Doppler on the right side and detected w/ Doppler on the left side.        Posterior tibial pulses are 2+ on the right side and 2+ on the left side.      Heart sounds: Heart sounds are distant. No murmur heard.  Pulmonary:      Breath sounds: No wheezing.      Comments: Coarse throughout, diminished to the left base  Chest:      Chest wall: No tenderness.   Abdominal:      Palpations: Abdomen  is soft.      Tenderness: There is no abdominal tenderness. There is no guarding or rebound.   Genitourinary:     Comments: Hurt in place  Musculoskeletal:         General: Normal range of motion.      Cervical back: Normal range of motion and neck supple.      Right lower le+ Edema present.      Left lower le+ Edema present.   Lymphadenopathy:      Cervical: No cervical adenopathy.   Skin:     General: Skin is warm and dry.      Capillary Refill: Capillary refill takes 2 to 3 seconds.      Findings: No rash.   Neurological:      Mental Status: He is alert and oriented to person, place, and time.      Cranial Nerves: No cranial nerve deficit.      Sensory: No sensory deficit.      Motor: No weakness.   Psychiatric:         Mood and Affect: Mood normal.         Behavior: Behavior normal.         Thought Content: Thought content normal.         Medications  Current Facility-Administered Medications   Medication Dose Route Frequency Provider Last Rate Last Admin   • acetaZOLAMIDE (DIAMOX) injection 250 mg  250 mg Intravenous Q12HRS Reva Barbosa M.D.   250 mg at 22 0601   • Pharmacy Consult: Enteral tube insertion - review meds/change route/product selection  1 Each Other PHARMACY TO DOSE Reva Barbosa M.D.       • ipratropium-albuterol (DUONEB) nebulizer solution  3 mL Nebulization Q4H PRN (RT) Reva Barbosa M.D.       • dexmedetomidine (Precedex) 400 mcg/100mL D5W premix infusion  0.1-1.5 mcg/kg/hr Intravenous Continuous JUDITH WayO.   Stopped at 22 1944   • amiodarone (NEXTERONE) 360 mg/200 mL infusion  0.5-1 mg/min Intravenous Continuous JUDITH WayO. 17 mL/hr at 22 0435 0.5 mg/min at 22 0435   • methylPREDNISolone (SOLU-MEDROL) 40 MG injection 40 mg  40 mg Intravenous DAILY Alana Wong M.D.   40 mg at 22 0611   • enoxaparin (Lovenox) inj 40 mg  40 mg Subcutaneous DAILY AT 1800 Reva Barbosa M.D.   40 mg at 22 1716   •  cefTRIAXone (Rocephin) syringe 1 g  1 g Intravenous Q24HRS Reva Barbosa M.D.   1 g at 07/24/22 0607   • acetaminophen (Tylenol) tablet 650 mg  650 mg Enteral Tube Q6HRS PRN Reva Barbosa M.D.   650 mg at 07/22/22 1036   • Respiratory Therapy Consult   Nebulization Continuous RT Maranda Khan M.D.       • famotidine (PEPCID) tablet 20 mg  20 mg Enteral Tube Q12HRS Maranda Khan M.D.   20 mg at 07/24/22 0553    Or   • famotidine (PEPCID) injection 20 mg  20 mg Intravenous Q12HRS Maranda Khan M.D.   20 mg at 07/23/22 1713   • senna-docusate (PERICOLACE or SENOKOT S) 8.6-50 MG per tablet 2 Tablet  2 Tablet Enteral Tube BID Maranda Khan M.D.   2 Tablet at 07/24/22 0554    And   • polyethylene glycol/lytes (MIRALAX) PACKET 1 Packet  1 Packet Enteral Tube QDAY PRN Maranda Khan M.D.        And   • magnesium hydroxide (MILK OF MAGNESIA) suspension 30 mL  30 mL Enteral Tube QDAY PRN Maranda Khan M.D.        And   • bisacodyl (DULCOLAX) suppository 10 mg  10 mg Rectal QDAY PRN Maranda Khan M.D.       • MD Alert...ICU Electrolyte Replacement per Pharmacy   Other PHARMACY TO DOSE Maranda Khan M.D.       • lidocaine (XYLOCAINE) 1 % injection 2 mL  2 mL Tracheal Tube Q30 MIN PRN Maranda Khan M.D.           Fluids    Intake/Output Summary (Last 24 hours) at 7/24/2022 0713  Last data filed at 7/24/2022 0600  Gross per 24 hour   Intake 1715.03 ml   Output 2205 ml   Net -489.97 ml       Laboratory  Recent Labs     07/22/22  0527 07/23/22  0000 07/23/22  0435   ISTATAPH 7.334* 7.537* 7.519*   ISTATAPCO2 61.8* 46.3* 50.0*   ISTATAPO2 73 61* 58*   ISTATATCO2 35* 41* 42*   YELPISZ8MVP 93 93 92*   ISTATARTHCO3 32.9* 39.3* 40.7*   ISTATARTBE 5* 15* 16*   ISTATTEMP 37.8 C 37.7 C 37.4 C   ISTATFIO2 90 60 60   ISTATSPEC Arterial Arterial Arterial   ISTATAPHTC 7.322* 7.526* 7.513*   GALSAVLD2RF 77 64 59*     Recent Labs     07/21/22  2228 07/22/22  0245   CPKTOTAL 88 124     Recent Labs      07/22/22 0245 07/23/22 0434 07/23/22 1730 07/24/22  0400   SODIUM 128* 132* 133* 131*   POTASSIUM 4.8 3.7 4.2 4.0   CHLORIDE 89* 90* 92* 93*   CO2 29 33 31 29   BUN 17 17 15 20   CREATININE 0.57 0.44* 0.47* 0.37*   MAGNESIUM 2.5 2.0  --  2.1   PHOSPHORUS 4.0 2.2*  --  2.9   CALCIUM 8.2* 8.4* 8.6 8.6     Recent Labs     07/21/22 2228 07/22/22 0245 07/23/22 0434 07/23/22 1730 07/24/22  0400   ALTSGPT 31  --   --   --   --    ASTSGOT 36  --   --   --   --    ALKPHOSPHAT 99  --   --   --   --    TBILIRUBIN 0.5  --   --   --   --    PREALBUMIN  --   --   --   --  12.0*   GLUCOSE 224*   < > 112* 126* 146*    < > = values in this interval not displayed.     Recent Labs     07/21/22 2228 07/22/22 0245 07/23/22 0434 07/24/22  0400   WBC 18.5* 21.2* 11.8* 13.6*   NEUTSPOLYS 87.00* 90.40* 84.50* 84.00*   LYMPHOCYTES 6.90* 0.90* 4.60* 5.00*   MONOCYTES 3.50 8.70 10.30 10.20   EOSINOPHILS 0.00 0.00 0.00 0.20   BASOPHILS 0.00 0.00 0.00 0.10   ASTSGOT 36  --   --   --    ALTSGPT 31  --   --   --    ALKPHOSPHAT 99  --   --   --    TBILIRUBIN 0.5  --   --   --      Recent Labs     07/22/22 0210 07/22/22 0245 07/23/22 0434 07/24/22  0400   RBC  --  4.75 4.05* 4.34*   HEMOGLOBIN  --  14.8 12.6* 13.6*   HEMATOCRIT  --  44.8 38.5* 41.3*   PLATELETCT  --  268 175 177   PROTHROMBTM 13.9  --   --   --    INR 1.08  --   --   --        Imaging  ECHO:  CONCLUSIONS  Stat echo ordered for shock. Markedly enlarged RA/RV with reduced RV systolic function. LV shwoing hyperdynamic function. LVEF 70% with mild concentric LVH. There is cavitary obliteration with flattened   interventricular septum looks to be intefering with LV filling. Dilated IVC consistent with volume overload. Overall picture consistent with cor pulmonale. No prior study is available for comparison.     CTA thoracoabdominal:  1.  No aortic aneurysm or dissection identified.  2.  Large layering left pleural effusion  3.  Collapse of the left upper lobe without  visualized bronchograms, could represent endobronchial obstruction from mucous or endobronchial lesion. Recommend bronchoscopy for further evaluation.  4.  Rightward shift of the mediastinum, likely due to large left effusion.  5.  Extensive emphysema  6.  Fat density mass in the duodenal lumen, appearance suggests lipoma.  7.  Diverticulosis  8.  Fat-containing umbilical hernia  9.  Atherosclerosis and atherosclerotic coronary artery disease    Assessment/Plan  * Cardiac arrest (HCC)- (present on admission)  Assessment & Plan  PEA cardiac arrest likely due to hypoxia  S/p ACLS/CPR/epi  No need for hypothermia protocols  Supportive care    Goals of care, counseling/discussion  Assessment & Plan  7/24:   I discussed Guille's diagnosis with him and explained the severity and the prognosis associated with what is likely primary lung cancer with a malignant pleural effusion which by definition makes this a stage IV cancer.  I explained that most treatments at this point would be for palliative reasons such as chemotherapy as they would give him time; however, he would have to be healthy/strong enough to even be considered to start these therapies.  I also discussed that he would likely be a good candidate for a Pleurex catheter for the effusion as I suspect this will continue to reaccumulate and being able to drain this fluid will help and assist with work of breathing.  Lastly, I discussed code status and explained that performing CPR was non beneficial care should his heart stop and he did agree with this.  He is still considering whether or not he would like to be reintubated so he is OK with it for now.  I did bring up Hospice and he is not interested at this time.  He was amenable to seeing Palliative care.    Hypokalemia- (present on admission)  Assessment & Plan  Replete to goal > 4.    Hyponatremia- (present on admission)  Assessment & Plan  ?SIADH related to lung disease  Following   Improving    Centrilobular  emphysema (HCC)- (present on admission)  Assessment & Plan  Noted on CT chest   Steroids/Bronchodilators  RT/O2 protocols  On home O2, no documentation of pulmonary visits or PFTs  Suspect end stage      Atelectasis of left lung- (present on admission)  Assessment & Plan  Compressive, ?obstructive-->?underlying mass  S/p bronchoscopy with BAL on 7/22 with suspicious mucosa to MAYCO  Cont ceftriaxone/azithromycin  Cultures negative,BAL cytology still not reported, pleural effusion showing numerous malignant cells consistent with stage 4 cancer diagnosis        Pleural effusion on left- (present on admission)  Assessment & Plan  S/p thoracentesis on 7/22 with 1.6 liters removed, bloody fluid  Concerning for underlying malignancy  Follow cultures, cytology  Cytology showing numerous malignant cells consistent with carcinoma, staining to follow to assist with origin, but suspect primary lung and stage 4     Acute on chronic respiratory failure with hypoxia and hypercapnia (HCC)- (present on admission)  Assessment & Plan  Intubated on 7/21 in the field  Cont full vent support  RT/O2 protocols  Vent bundle protocols  Lung protective ventilator strategies  Steroids/Bronchodilators  Bronchoscopy with BAL/cytology on 7/22, cont Ceftriaxone/azithromycin  SAT/SBTs-->extubated to high flow   High chance of failure and needing reintubation  Cont aggressive diuresis    Pulmonary hypertension (HCC)  Assessment & Plan  Severe in nature with cor pulmonale due to severe lung disease   Fairly poor prognosis  Cont O2 therapy  Aggressive diuresis ongoing       VTE:  Lovenox  Ulcer: H2 Antagonist  Lines: Central Line  Ongoing indication addressed, Arterial Line  Ongoing indication addressed and Hurt Catheter  Ongoing indication addressed    I have performed a physical exam and reviewed and updated ROS and Plan today (7/24/2022). In review of yesterday's note (7/23/2022), there are no changes except as documented above.     Discussed  patient condition and risk of morbidity and/or mortality with Family, RN, RT, Pharmacy, Charge nurse / hot rounds and Patient    Mr. Greenwood remains critically ill despite being able to extubate him today.  He is on high flow nasal cannula for acute on chronic hypoxic respiratory failure due to severe pulmonary hypertension as well as emphysema and fluid overload.  I am actively titrating oxygen concentration for high flow nasal cannula.  I am concerned that the patient is high risk for reintubation in the next 12 hours.  Otherwise we will continue aggressive diuresis as well as aggressive pulmonary toilet.  The patient remains at high risk of clinical deterioration, worsening vital organ dysfunction, and death without the above critical care interventions.    Critical care time = 90 minutes in directly providing and coordinating critical care and extensive data review.  No time overlap and excludes procedures.

## 2022-07-24 NOTE — CONSULTS
Reason for PC Consult: Advance Care Planning    Consulted by: Dr. Barbosa    Assessment:  General: Per H&P note Mr. Greenwood is a 77 year old male with the past medical history of COPD on home O2 who had reportedly complained of worsening shortness of breath for the last 1-2 weeks that acutely worsened in the last 2 days prior to admission. EMS was activated and found the patient lethargic with low O2 sats.  He was placed on BiPAP without improvement of O2 sats and intubated in the field. Upon arrival to the ER, the patient had a PEA cardiac arrest requiring 2 rounds of CPR/epi.  He was found to have a large left sided pleural effusion with compressive atelectasis.  He was admitted to the ICU. A fib RVR noted Vented for 4 days, extubated 7/24/2022. HF 60L Fi02 100%. S/p thoracentesis on 7/22 with 1.6 L bloody fluid cytology showing numerous malignant cells consistent with carcinoma suspect stage 4 lung cancer.     Social: Pt has a spouse Devorah DTR Barbie and son.     Consults: Critical care    Dyspnea: Yes-    Last BM:  (PTA)-    Pain: No-    Depression: No-    Dementia: No;       Spiritual:  Is Spiritism or spirituality important for coping with this illness? No-    Has a  or spiritual provider visit been requested? No    Palliative Performance Scale: 30 %    Advance Directive: None-    DPOA: No-    POLST: No-      Code Status: DNR- IOK    Outcome:  Appreciated updates from Dr. Barbosa, attempted to met with pt and his wife Sasha Fairchild left prior to PC RN arrival. Stated to the BS RN that she did not want to speak with Palliative care RN. Introduced self to Guille with Dr. Barbosa at bedside. He was on HF 02 with work of breathing. He stated that he was comfortable and did not have any fears at this time. He was alert and able to further address his wishes due to his increase efforts with his work of breathing. When asked about re intubation, Guille made it clear that he would not want to be intubated  "again or on \"life support\" He expressed that he has done everything he has wanted to do and has a good family. He understands his hx of COPD and lung disease and that going back on the ventilator will only prevent the inevitable. He had express the desire for a Budlight and Dr. Barbosa stated that she will make sure he has one and that he can have medications to help his SOB. Dr. Barboas will call Guille's wife about the change in code status.   Guille stated that he was not spiritual and would not want a  at this time.     Active listening, validation and normalization and empathic support provided throughout the encounter.     Updated: RN    Plan: Continue to support as needed with GOC and changes.     Thank you for allowing Palliative Care to participate in this patient's care. Please feel free to call [phone] with any questions or concerns.  "

## 2022-07-24 NOTE — PROGRESS NOTES
Notified Dr. Richards of patients sustained HR from 119-123. New order for amiodarone 150 mg IVPB bolus.

## 2022-07-24 NOTE — PROGRESS NOTES
Patient converted to a-fib, -112. STAT EKG. Notified Dr. Richards. Per Susie, notify if rate is >120.

## 2022-07-25 VITALS
WEIGHT: 184.53 LBS | HEIGHT: 69 IN | SYSTOLIC BLOOD PRESSURE: 144 MMHG | OXYGEN SATURATION: 83 % | RESPIRATION RATE: 19 BRPM | TEMPERATURE: 100 F | HEART RATE: 81 BPM | BODY MASS INDEX: 27.33 KG/M2 | DIASTOLIC BLOOD PRESSURE: 67 MMHG

## 2022-07-25 LAB
BACTERIA FLD AEROBE CULT: NORMAL
GRAM STN SPEC: NORMAL
SIGNIFICANT IND 70042: NORMAL
SITE SITE: NORMAL
SOURCE SOURCE: NORMAL

## 2022-07-25 NOTE — PROGRESS NOTES
Family, MD, [facility],  and security notified about patient status.    Family does not have a mortuary at this time and will follow up tomorrow.     Family aware of PT belongs and will pick tomorrow.

## 2022-07-25 NOTE — PROGRESS NOTES
Daughter Diana called and updated about patient clinical status. She acknowledged the information. She is with the rest of the family and will relay the information.

## 2022-07-25 NOTE — PROGRESS NOTES
Update:     I was notified around approximately 8:35 PM that the patient had a sudden change in clinical status.  He had been using his cell phone and apparently was also recently visiting his son.  Bedside RN went to check on the patient and the patient was unresponsive.  He did have a pulse and was showing pulse oximetry in the 90s.  Pupils are equal.  He had foamy blood-tinged secretions coming from his mouth.  He was not responsive to sternal rubs or to voice.  No seizure-like activity was identified.  I asked for a stat ABG.  The ABG confirming suspicion that he is in hypercapnic respiratory failure.  pH was 7.025 with a PCO2 of greater than 130.  We briefly placed an OG tube for suctioning of stomach secretions.  The secretions were mostly brown with some bright red blood mixed in.  The OG-tube was removed.  I called and spoke to his wife Devorah twice.  In my first conversation I informed her of the sudden change in clinical status.  I reviewed Dr. Barbosa's note from earlier today.  I reiterated that the patient was alert and oriented and expresses wishes for no CPR no reintubation.  Devorah did confirm that this was consistent with what she knew.  I called her after the ABG and updated her on the cause for his change in mental status.  I informed her that he is in end-stage respiratory failure that is likely to die tonight.  She was tearful and acknowledged what I told her.  She states she is okay with us giving medications for comfort if we feel that he is uncomfortable.  I did tell her that he is currently comfortable but thanked her for for giving us permission to help him with pain or anxiety if it is needed.  I asked her to please inform her family so that they can come to bedside if anyone would like to see him before he dies.    I also discussed this plan with bedside RN, charge RN, RT.  By now there are no changes in the plan.  I will prescribe benzodiazepines and opiates as needed if he becomes  uncomfortable.    Advanced care planning time: 21 min     Ventura Richards DO  Staff Pulmonologist and Intensivist  [facility]     Please note that this dictation was created using voice recognition software. The accuracy of the dictation is limited to the abilities of the software. I have made every reasonable attempt to correct obvious errors, but I expect that there are errors of grammar and possibly content that I did not discover before finalizing the note.

## 2022-07-27 LAB
BACTERIA BLD CULT: NORMAL
BACTERIA BLD CULT: NORMAL
BACTERIA SPEC ANAEROBE CULT: NORMAL
SIGNIFICANT IND 70042: NORMAL
SITE SITE: NORMAL
SOURCE SOURCE: NORMAL

## 2022-07-31 NOTE — DISCHARGE SUMMARY
Death Summary    Cause of Death  Cardiac arrest due to respiratory failure due to pneumonia due to metastatic cancer with an unknown primary/no tissue diagnosis.    Comorbid Conditions at the Time of Death  Principal Problem:    Cardiac arrest (HCC) POA: Yes  Active Problems:    Acute on chronic respiratory failure with hypoxia and hypercapnia (HCC) POA: Yes    Pleural effusion on left POA: Yes    Atelectasis of left lung POA: Yes    Centrilobular emphysema (HCC) POA: Yes    Hyponatremia POA: Yes    Hypokalemia POA: Yes    Goals of care, counseling/discussion POA: Unknown    Pulmonary hypertension (HCC) POA: Unknown  Resolved Problems:    * No resolved hospital problems. *      History of Presenting Illness and Hospital Course  Patient is a 78yo male with COPD and CHRF that was originally complaining of dyspnea for 1-2 weeks with significant worsening 1-2 days prior. He came to the ED on 7/21/22. Patient was found by EMS and immediately required intubation, as unable to maintain sats on BiPAP. Arrived at ED and went into cardiac arrest. ACLS was initiated, ROSC achieved, and R IJ placed with initiation of norepi for pressure support. EEG performed showing diffuse background slowing.    07/22: Intubated. RR 20, , PEEP 14, FiO2 90%. Bronchoscopy performed at bedside; BAL sent for analysis and cytology. DVT US neg. Pleural fluid analysis showing exudative effusion, likely malignancy.     07/23: Weaned off of pressors and sedation; following commands. UOP 1500. CXR with MAYCO opacity and increased interstitial markings. Started on acetazolamide 250mg q12h. Bronch path showing carcinoma of L lung.     07/24: Afib w/ RVR overnight; amio 150mg IV x1 and started on amio gtt. Net -490mL (UOP 2.2L). Extubated this morning. On HFNC 35L FiO2 100%. A&Ox4. CXR relatively unchanged. Pathology of L lung BAL showing carcinoma; likely stage 4 d/t blood pleural effusion on thoracentesis. Patient changed code status from full code  to DNAR, I OK.  At 8:35 PM the patient had a sudden change in clinical status.He had been using his cell phone and apparently was also recently visiting his son.  Bedside RN went to check on the patient and the patient was unresponsive.  He did have a pulse and was showing pulse oximetry in the 90s.  Pupils are equal.  He had foamy blood-tinged secretions coming from his mouth.  He was not responsive to sternal rubs or to voice.  No seizure-like activity was identified.  I asked for a stat ABG.  The ABG confirming suspicion that he is in hypercapnic respiratory failure.  pH was 7.025 with a PCO2 of greater than 130.  We briefly placed an OG tube for suctioning of stomach secretions.  The secretions were mostly brown with some bright red blood mixed in.  The OG-tube was removed.  I called and spoke to his wife Devorah twice.  In my first conversation I informed her of the sudden change in clinical status.  I reviewed Dr. Barbosa's note from earlier today.  I reiterated that the patient was alert and oriented and expresses wishes for no CPR no reintubation.  Devorah did confirm that this was consistent with what she knew.  I called her after the ABG and updated her on the cause for his change in mental status.  I informed her that he is in end-stage respiratory failure that is likely to die tonight.  She was tearful and acknowledged what I told her.  She states she is okay with us giving medications for comfort if we feel that he is uncomfortable.  I did tell her that he is currently comfortable but thanked her for for giving us permission to help him with pain or anxiety if it is needed.  I asked her to please inform her family so that they can come to bedside if anyone would like to see him before he dies.  Comfort care orders were placed.  The patient passed peacefully at 2237.      Death Date: 07/24/22   Death Time: 2237         Pronounced By (RN1): Kali  Pronounced By (RN2): Mason

## 2022-08-04 NOTE — DOCUMENTATION QUERY
[facility]                                                                       Query Response Note      PATIENT:               CHANTEL AUSTIN  ACCT #:                  [id number]  MRN:                     [id number]  :                      1945  ADMIT DATE:       2022 9:55 PM  DISCH DATE:        2022 10:37 PM  RESPONDING  PROVIDER #:        [id number]           QUERY TEXT:    There is conflicting documentation in the medical record.  The Pathology reports and progress notes state malignant cells consistent with primary lung adenocarcinoma. The DC summary states the primary is unknown.  Based on clinical findings, please specify the type of malignancy monitored, evaluated, and treated.    The patient's Clinical Indicators include:    CC  - 76 y/o male with cardiac arrest likely due to hypoxemia, PEA arrest      Thoracentesis Pathology  A. Pleural fluid: Malignant cells present, consistent with primary lung adenocarcinoma.    BAL  Comment: The cytomorphologic features of the malignant cells seen in  the bilateral BAL's are identical to those of the malignant cells seen  in the patient's pleural effusion (case [id number]) which have an  immunohistochemical histochemical profile consistent with primary lung adenocarcinoma.      DCS  - Cardiac arrest due to respiratory failure due to pneumonia due to metastatic cancer with an unknown     primary/no tissue diagnosis.  - :  Bronch path showing carcinoma of L lung.   - : Pathology of L lung BAL showing carcinoma; likely stage 4 d/t blood pleural  effusion on thoracentesis  Options provided:   -- Primary lung adenocarcinoma with metastasis was the cause of the pneumonia.   -- I disagree with the pathology report. The lung adenocarcinoma is a secondary malignancy.   -- Other (please specify)   -- Unable to clinically determine      Query created by: Diana Young on  8/1/2022 4:19 PM    RESPONSE TEXT:    Primary lung adenocarcinoma with metastasis was the cause of the pneumonia.          Electronically signed by:  BRADLY RICE MD 8/4/2022 2:45 PM

## 2022-08-16 LAB
FUNGUS SPEC CULT: NORMAL
FUNGUS SPEC CULT: NORMAL
FUNGUS SPEC FUNGUS STN: NORMAL
FUNGUS SPEC FUNGUS STN: NORMAL
SIGNIFICANT IND 70042: NORMAL
SIGNIFICANT IND 70042: NORMAL
SITE SITE: NORMAL
SITE SITE: NORMAL
SOURCE SOURCE: NORMAL
SOURCE SOURCE: NORMAL